# Patient Record
Sex: MALE | Race: WHITE | NOT HISPANIC OR LATINO | Employment: OTHER | URBAN - METROPOLITAN AREA
[De-identification: names, ages, dates, MRNs, and addresses within clinical notes are randomized per-mention and may not be internally consistent; named-entity substitution may affect disease eponyms.]

---

## 2017-05-19 ENCOUNTER — TRANSCRIBE ORDERS (OUTPATIENT)
Dept: ADMINISTRATIVE | Facility: HOSPITAL | Age: 67
End: 2017-05-19

## 2017-05-19 DIAGNOSIS — Z87.891 HISTORY OF SMOKING: Primary | ICD-10-CM

## 2017-05-22 ENCOUNTER — HOSPITAL ENCOUNTER (OUTPATIENT)
Dept: CT IMAGING | Facility: HOSPITAL | Age: 67
Discharge: HOME | End: 2017-05-22
Attending: INTERNAL MEDICINE
Payer: COMMERCIAL

## 2017-05-22 DIAGNOSIS — Z87.891 HISTORY OF SMOKING: ICD-10-CM

## 2017-09-13 ENCOUNTER — TRANSCRIBE ORDERS (OUTPATIENT)
Dept: ADMINISTRATIVE | Facility: HOSPITAL | Age: 67
End: 2017-09-13

## 2017-09-13 DIAGNOSIS — J44.9 OBSTRUCTIVE CHRONIC BRONCHITIS WITHOUT EXACERBATION (HCC): Primary | ICD-10-CM

## 2017-09-27 ENCOUNTER — HOSPITAL ENCOUNTER (OUTPATIENT)
Dept: PULMONOLOGY | Facility: HOSPITAL | Age: 67
Discharge: HOME | End: 2017-09-27
Attending: INTERNAL MEDICINE
Payer: MEDICARE

## 2017-09-27 ENCOUNTER — GENERIC CONVERSION - ENCOUNTER (OUTPATIENT)
Dept: OTHER | Facility: OTHER | Age: 67
End: 2017-09-27

## 2017-09-27 DIAGNOSIS — J44.9 OBSTRUCTIVE CHRONIC BRONCHITIS WITHOUT EXACERBATION (HCC): ICD-10-CM

## 2017-09-27 PROCEDURE — 94760 N-INVAS EAR/PLS OXIMETRY 1: CPT

## 2017-09-27 PROCEDURE — 94010 BREATHING CAPACITY TEST: CPT

## 2017-09-27 RX ORDER — ALBUTEROL SULFATE 2.5 MG/3ML
2.5 SOLUTION RESPIRATORY (INHALATION) ONCE AS NEEDED
Status: DISCONTINUED | OUTPATIENT
Start: 2017-09-27 | End: 2017-09-27

## 2018-01-17 ENCOUNTER — ALLSCRIPTS OFFICE VISIT (OUTPATIENT)
Dept: OTHER | Facility: OTHER | Age: 68
End: 2018-01-17

## 2018-01-17 DIAGNOSIS — R31.0 GROSS HEMATURIA: ICD-10-CM

## 2018-01-17 LAB
BILIRUB UR QL STRIP: NORMAL
CLARITY UR: NORMAL
COLOR UR: YELLOW
GLUCOSE (HISTORICAL): NORMAL
HGB UR QL STRIP.AUTO: NORMAL
KETONES UR STRIP-MCNC: NORMAL MG/DL
LEUKOCYTE ESTERASE UR QL STRIP: NORMAL
NITRITE UR QL STRIP: NORMAL
PH UR STRIP.AUTO: 7 [PH]
PROT UR STRIP-MCNC: NORMAL MG/DL
SP GR UR STRIP.AUTO: 1.01
UROBILINOGEN UR QL STRIP.AUTO: 0.2

## 2018-01-22 VITALS
HEIGHT: 70 IN | SYSTOLIC BLOOD PRESSURE: 110 MMHG | WEIGHT: 205 LBS | DIASTOLIC BLOOD PRESSURE: 62 MMHG | BODY MASS INDEX: 29.35 KG/M2

## 2018-01-31 ENCOUNTER — HOSPITAL ENCOUNTER (OUTPATIENT)
Dept: ULTRASOUND IMAGING | Facility: MEDICAL CENTER | Age: 68
Discharge: HOME | End: 2018-01-31
Attending: UROLOGY
Payer: MEDICARE

## 2018-01-31 DIAGNOSIS — R31.0 GROSS HEMATURIA: ICD-10-CM

## 2018-01-31 PROCEDURE — 76770 US EXAM ABDO BACK WALL COMP: CPT

## 2018-02-13 RX ORDER — FINASTERIDE 5 MG/1
TABLET, FILM COATED ORAL
COMMUNITY
End: 2018-03-04 | Stop reason: SDUPTHER

## 2018-02-13 RX ORDER — CALCIPOTRIENE 0.005 %
CREAM (GRAM) TOPICAL
COMMUNITY
End: 2020-04-07

## 2018-02-13 RX ORDER — RAMIPRIL 10 MG/1
10 CAPSULE ORAL DAILY
COMMUNITY

## 2018-02-13 RX ORDER — TERAZOSIN 5 MG/1
5 CAPSULE ORAL
COMMUNITY
End: 2022-08-08 | Stop reason: ALTCHOICE

## 2018-02-13 RX ORDER — LATANOPROST 50 UG/ML
1 SOLUTION/ DROPS OPHTHALMIC
COMMUNITY

## 2018-02-13 RX ORDER — CLOBETASOL PROPIONATE 0.5 MG/G
CREAM TOPICAL 2 TIMES DAILY PRN
COMMUNITY

## 2018-02-13 RX ORDER — PRAVASTATIN SODIUM 10 MG
10 TABLET ORAL DAILY
COMMUNITY

## 2018-02-13 RX ORDER — CHOLECALCIFEROL (VITAMIN D3) 1250 MCG
CAPSULE ORAL WEEKLY
COMMUNITY
End: 2020-06-26

## 2018-02-14 ENCOUNTER — PROCEDURE VISIT (OUTPATIENT)
Dept: UROLOGY | Facility: MEDICAL CENTER | Age: 68
End: 2018-02-14
Payer: MEDICARE

## 2018-02-14 VITALS
HEIGHT: 70 IN | SYSTOLIC BLOOD PRESSURE: 138 MMHG | WEIGHT: 205 LBS | BODY MASS INDEX: 29.35 KG/M2 | DIASTOLIC BLOOD PRESSURE: 72 MMHG

## 2018-02-14 DIAGNOSIS — N13.8 BENIGN PROSTATIC HYPERPLASIA WITH URINARY OBSTRUCTION: ICD-10-CM

## 2018-02-14 DIAGNOSIS — R31.0 GROSS HEMATURIA: Primary | ICD-10-CM

## 2018-02-14 DIAGNOSIS — N40.1 BENIGN PROSTATIC HYPERPLASIA WITH URINARY OBSTRUCTION: ICD-10-CM

## 2018-02-14 PROBLEM — H40.9 GLAUCOMA: Status: ACTIVE | Noted: 2018-01-11

## 2018-02-14 PROBLEM — I10 HYPERTENSION: Status: ACTIVE | Noted: 2018-01-11

## 2018-02-14 PROBLEM — L40.9 PSORIASIS: Status: ACTIVE | Noted: 2018-01-11

## 2018-02-14 PROBLEM — E78.00 HYPERCHOLESTEROLEMIA: Status: ACTIVE | Noted: 2018-01-11

## 2018-02-14 LAB
SL AMB  POCT GLUCOSE, UA: ABNORMAL
SL AMB LEUKOCYTE ESTERASE,UA: ABNORMAL
SL AMB POCT BILIRUBIN,UA: ABNORMAL
SL AMB POCT BLOOD,UA: ABNORMAL
SL AMB POCT CLARITY,UA: CLEAR
SL AMB POCT COLOR,UA: ABNORMAL
SL AMB POCT KETONES,UA: ABNORMAL
SL AMB POCT NITRITE,UA: ABNORMAL
SL AMB POCT PH,UA: 6.5
SL AMB POCT SPECIFIC GRAVITY,UA: 1.02
SL AMB POCT URINE PROTEIN: ABNORMAL
SL AMB POCT UROBILINOGEN: 1

## 2018-02-14 PROCEDURE — 81003 URINALYSIS AUTO W/O SCOPE: CPT | Performed by: UROLOGY

## 2018-02-14 PROCEDURE — 99213 OFFICE O/P EST LOW 20 MIN: CPT | Performed by: UROLOGY

## 2018-02-14 PROCEDURE — 52000 CYSTOURETHROSCOPY: CPT | Performed by: UROLOGY

## 2018-02-14 NOTE — PROGRESS NOTES
Assessment/Plan:    Gross hematuria  Renal ultrasound is unremarkable  On cystoscopy the prostate is very vascular and friable  No bladder tumor is evident  The prostatic friable it is the likely cause  Benign prostatic hyperplasia with urinary obstruction  AUA symptom score is 14 on combined therapy  The patient is pleased with his voiding pattern  Continue present therapy  PSA 0 5 8 December, 2017       Diagnoses and all orders for this visit:    Gross hematuria  -     POCT urine dip auto non-scope  -     Cystoscopy    Benign prostatic hyperplasia with urinary obstruction  -     PSA Total, Diagnostic; Future    Other orders  -     aspirin 81 MG tablet; Take by mouth  -     clobetasol (TEMOVATE) 0 05 % cream; Apply topically  -     Coenzyme Q10 (COQ-10) 200 MG CAPS; Take by mouth  -     calcipotriene (DOVONEX) 0 005 % cream; Apply topically  -     pravastatin (PRAVACHOL) 10 mg tablet; Take by mouth  -     finasteride (PROSCAR) 5 mg tablet; Take by mouth  -     ramipril (ALTACE) 10 MG capsule; Take by mouth  -     terazosin (HYTRIN) 5 mg capsule; Take by mouth  -     Cholecalciferol (VITAMIN D3) 38345 units CAPS; Take by mouth  -     latanoprost (XALATAN) 0 005 % ophthalmic solution; Apply to eye          Subjective:      Patient ID: Cristiane Luis is a 75 y o  male  58-year-old male on combined therapy for his lower tract symptoms who returns today for follow-up and to complete his evaluation for gross hematuria  He reports he is voiding well and has not had any change in his voiding pattern  No recent gross hematuria, dysuria or symptoms of infection  No flank pain  He is pleased with his voiding pattern          The following portions of the patient's history were reviewed and updated as appropriate: allergies, current medications, past family history, past medical history, past social history, past surgical history and problem list     Review of Systems   Constitutional: Negative for chills, diaphoresis, fatigue and fever  HENT: Negative  Eyes: Negative  Respiratory: Negative  Cardiovascular: Negative  Endocrine: Negative  Musculoskeletal: Negative  Skin: Negative  Allergic/Immunologic: Negative  Neurological: Negative  Hematological: Negative  Psychiatric/Behavioral: Negative  Objective:    Vitals:    02/14/18 1037   BP: 138/72        Physical Exam   Constitutional: He is oriented to person, place, and time  He appears well-developed and well-nourished  HENT:   Head: Normocephalic and atraumatic  Eyes: Pupils are equal, round, and reactive to light  Neck: Neck supple  Cardiovascular: Normal rate  Pulmonary/Chest: Effort normal    Abdominal: Soft  He exhibits no distension and no mass  There is no tenderness  There is no rebound and no guarding  Genitourinary: Penis normal    Neurological: He is alert and oriented to person, place, and time  Skin: Skin is warm and dry  Psychiatric: He has a normal mood and affect  His behavior is normal  Judgment and thought content normal        Cystoscopy  Date/Time: 2/14/2018 11:23 AM  Performed by: Bri Montoya  Authorized by: Bri Montoya     Procedure details: cystoscopy    Patient tolerance: Patient tolerated the procedure well with no immediate complications    Additional Procedure Details: Cystoscopy Procedure Note        Pre-operative Diagnosis: Hematuria    Post-operative Diagnosis:  Prostatic enlargement and friability, no evidence of bladder tumor      Procedure Details   The risks, benefits, complications, treatment options, and expected outcomes were discussed with the patient  The patient concurred with the proposed plan, giving informed consent  Cystoscopy was performed today under local anesthesia, using sterile technique  The patient was placed in the supine position, prepped and draped in the usual sterile fashion   A 15 American flexible cystoscope  was used to inspect both the urethra and bladder  Findings:  Normal urethra, trilobar prostatic hypertrophy  The prostate is very vascular with superficial vessels that bleed easily  Ureteral orifices are unremarkable  Bladder is moderately trabeculated  No stones, tumors or diverticula

## 2018-02-14 NOTE — ASSESSMENT & PLAN NOTE
AUA symptom score is 14 on combined therapy  The patient is pleased with his voiding pattern  Continue present therapy    PSA 0 5 8 December, 2017

## 2018-02-14 NOTE — ASSESSMENT & PLAN NOTE
Renal ultrasound is unremarkable  On cystoscopy the prostate is very vascular and friable  No bladder tumor is evident  The prostatic friable it is the likely cause

## 2018-02-14 NOTE — LETTER
February 14, 2018     Merline Roller, [place]  [address] [place] [address]    Patient: Bartolome Dinh   YOB: 1950   Date of Visit: 2/14/2018       Dear Dr Martin Marquez:    Thank you for referring Bartolome Dinh to me for evaluation  Below are my notes for this consultation  If you have questions, please do not hesitate to call me  I look forward to following your patient along with you  Sincerely,        Tavares Patten MD        CC: No Recipients  Tavares Patten MD  2/14/2018 11:26 AM  Sign at close encounter  Assessment/Plan:    Gross hematuria  Renal ultrasound is unremarkable  On cystoscopy the prostate is very vascular and friable  No bladder tumor is evident  The prostatic friable it is the likely cause  Benign prostatic hyperplasia with urinary obstruction  AUA symptom score is 14 on combined therapy  The patient is pleased with his voiding pattern  Continue present therapy  Diagnoses and all orders for this visit:    Gross hematuria  -     POCT urine dip auto non-scope    Benign prostatic hyperplasia with urinary obstruction    Other orders  -     aspirin 81 MG tablet; Take by mouth  -     clobetasol (TEMOVATE) 0 05 % cream; Apply topically  -     Coenzyme Q10 (COQ-10) 200 MG CAPS; Take by mouth  -     calcipotriene (DOVONEX) 0 005 % cream; Apply topically  -     pravastatin (PRAVACHOL) 10 mg tablet; Take by mouth  -     finasteride (PROSCAR) 5 mg tablet; Take by mouth  -     ramipril (ALTACE) 10 MG capsule; Take by mouth  -     terazosin (HYTRIN) 5 mg capsule; Take by mouth  -     Cholecalciferol (VITAMIN D3) 25359 units CAPS; Take by mouth  -     latanoprost (XALATAN) 0 005 % ophthalmic solution; Apply to eye          Subjective:      Patient ID: Bartolome Dinh is a 75 y o  male  59-year-old male on combined therapy for his lower tract symptoms who returns today for follow-up and to complete his evaluation for gross hematuria    He reports he is voiding well and has not had any change in his voiding pattern  No recent gross hematuria, dysuria or symptoms of infection  No flank pain  He is pleased with his voiding pattern  The following portions of the patient's history were reviewed and updated as appropriate: allergies, current medications, past family history, past medical history, past social history, past surgical history and problem list     Review of Systems   Constitutional: Negative for chills, diaphoresis, fatigue and fever  HENT: Negative  Eyes: Negative  Respiratory: Negative  Cardiovascular: Negative  Endocrine: Negative  Musculoskeletal: Negative  Skin: Negative  Allergic/Immunologic: Negative  Neurological: Negative  Hematological: Negative  Psychiatric/Behavioral: Negative  Objective:    Vitals:    02/14/18 1037   BP: 138/72        Physical Exam   Constitutional: He is oriented to person, place, and time  He appears well-developed and well-nourished  HENT:   Head: Normocephalic and atraumatic  Eyes: Pupils are equal, round, and reactive to light  Neck: Neck supple  Cardiovascular: Normal rate  Pulmonary/Chest: Effort normal    Abdominal: Soft  He exhibits no distension and no mass  There is no tenderness  There is no rebound and no guarding  Genitourinary: Penis normal    Neurological: He is alert and oriented to person, place, and time  Skin: Skin is warm and dry  Psychiatric: He has a normal mood and affect   His behavior is normal  Judgment and thought content normal        Cystoscopy  Date/Time: 2/14/2018 11:23 AM  Performed by: Bri Montoya  Authorized by: Bri Montoya     Procedure details: cystoscopy    Patient tolerance: Patient tolerated the procedure well with no immediate complications    Additional Procedure Details: Cystoscopy Procedure Note        Pre-operative Diagnosis: Hematuria    Post-operative Diagnosis:  Prostatic enlargement and friability, no evidence of bladder tumor      Procedure Details   The risks, benefits, complications, treatment options, and expected outcomes were discussed with the patient  The patient concurred with the proposed plan, giving informed consent  Cystoscopy was performed today under local anesthesia, using sterile technique  The patient was placed in the supine position, prepped and draped in the usual sterile fashion  A 15 Turkish flexible cystoscope  was used to inspect both the urethra and bladder  Findings:  Normal urethra, trilobar prostatic hypertrophy  The prostate is very vascular with superficial vessels that bleed easily  Ureteral orifices are unremarkable  Bladder is moderately trabeculated  No stones, tumors or diverticula

## 2018-02-14 NOTE — PATIENT INSTRUCTIONS

## 2018-03-02 DIAGNOSIS — N40.1 BPH WITH OBSTRUCTION/LOWER URINARY TRACT SYMPTOMS: Primary | ICD-10-CM

## 2018-03-02 DIAGNOSIS — N13.8 BPH WITH OBSTRUCTION/LOWER URINARY TRACT SYMPTOMS: Primary | ICD-10-CM

## 2018-03-04 RX ORDER — FINASTERIDE 5 MG/1
TABLET, FILM COATED ORAL
Qty: 90 TABLET | Refills: 3 | Status: SHIPPED | OUTPATIENT
Start: 2018-03-04 | End: 2019-02-25 | Stop reason: SDUPTHER

## 2018-04-26 ENCOUNTER — TELEPHONE (OUTPATIENT)
Dept: UROLOGY | Facility: MEDICAL CENTER | Age: 68
End: 2018-04-26

## 2018-04-26 NOTE — TELEPHONE ENCOUNTER
Spoke to pt and wife  Pt is asymptomatic besides frequency that isn't new  Received UC positive for >100,000 col/ml Klebsiella (Enterobacteria) Aerogenes  Will forward to Dr Bella Casas to advise informed pt that will get answer tomorrow and call

## 2018-04-26 NOTE — TELEPHONE ENCOUNTER
Patient's PCP found bacteria in his urine sample  Patient was told to call here to see if he needs to be put on an antibiotic  Please call him at [phone]

## 2018-04-27 DIAGNOSIS — N30.00 ACUTE CYSTITIS WITHOUT HEMATURIA: Primary | ICD-10-CM

## 2018-04-27 RX ORDER — SULFAMETHOXAZOLE AND TRIMETHOPRIM 800; 160 MG/1; MG/1
1 TABLET ORAL EVERY 12 HOURS SCHEDULED
Qty: 14 TABLET | Refills: 0 | Status: SHIPPED | OUTPATIENT
Start: 2018-04-27 | End: 2018-05-04

## 2018-05-17 PROCEDURE — 88305 TISSUE EXAM BY PATHOLOGIST: CPT | Performed by: PATHOLOGY

## 2018-05-18 ENCOUNTER — LAB REQUISITION (OUTPATIENT)
Dept: LAB | Facility: HOSPITAL | Age: 68
End: 2018-05-18
Payer: MEDICARE

## 2018-05-18 DIAGNOSIS — R13.19 OTHER DYSPHAGIA: ICD-10-CM

## 2019-02-22 ENCOUNTER — OFFICE VISIT (OUTPATIENT)
Dept: UROLOGY | Facility: MEDICAL CENTER | Age: 69
End: 2019-02-22
Payer: MEDICARE

## 2019-02-22 VITALS
SYSTOLIC BLOOD PRESSURE: 128 MMHG | HEART RATE: 70 BPM | HEIGHT: 70 IN | WEIGHT: 203 LBS | BODY MASS INDEX: 29.06 KG/M2 | DIASTOLIC BLOOD PRESSURE: 64 MMHG

## 2019-02-22 DIAGNOSIS — N40.1 BPH WITH OBSTRUCTION/LOWER URINARY TRACT SYMPTOMS: ICD-10-CM

## 2019-02-22 DIAGNOSIS — N13.8 BPH WITH OBSTRUCTION/LOWER URINARY TRACT SYMPTOMS: ICD-10-CM

## 2019-02-22 DIAGNOSIS — Z12.5 PROSTATE CANCER SCREENING: Primary | ICD-10-CM

## 2019-02-22 LAB
SL AMB  POCT GLUCOSE, UA: NORMAL
SL AMB LEUKOCYTE ESTERASE,UA: NORMAL
SL AMB POCT BILIRUBIN,UA: NORMAL
SL AMB POCT BLOOD,UA: NORMAL
SL AMB POCT CLARITY,UA: CLEAR
SL AMB POCT COLOR,UA: YELLOW
SL AMB POCT KETONES,UA: NORMAL
SL AMB POCT NITRITE,UA: NORMAL
SL AMB POCT PH,UA: 7
SL AMB POCT SPECIFIC GRAVITY,UA: 1.01
SL AMB POCT URINE PROTEIN: NORMAL
SL AMB POCT UROBILINOGEN: 0.2

## 2019-02-22 PROCEDURE — 81003 URINALYSIS AUTO W/O SCOPE: CPT | Performed by: NURSE PRACTITIONER

## 2019-02-22 PROCEDURE — 99213 OFFICE O/P EST LOW 20 MIN: CPT | Performed by: NURSE PRACTITIONER

## 2019-02-22 RX ORDER — PANTOPRAZOLE SODIUM 40 MG/1
TABLET, DELAYED RELEASE ORAL
COMMUNITY
Start: 2019-01-05 | End: 2020-01-22 | Stop reason: ALTCHOICE

## 2019-02-22 NOTE — PROGRESS NOTES
2/22/2019      Chief Complaint   Patient presents with    Benign Prostatic Hypertrophy     Assessment and Plan    70 y o  male managed by Dr Sridhar Altamirano  1  Benign prostatic hyperplasia  · Patient is status post cystoscopy on 02/14/2018 which is prostate was found to be very vascular and friable which at the time was the probable cause of his gross hematuria  · Patient denies any further episodes of gross hematuria  · PSA performed on 02/14/2019  results as 0 46  · Digital rectal exam demonstrates approximately 45 g prostate  Smooth, nontender, nonnodular and symmetrical  · Repeat PSA and ANALIA in 1 year    2  Erectile dysfunction  · Patient reports the ability to get an erection but difficulty maintaining  Acknowledges that this is the problem but currently does not want to intervene at this time  History of Present Illness  Roberto Farr is a 70 y o  male here for follow up evaluation of benign prostatic hyperplasia and routine prostate cancer screening  Patient presents  to the office free a complaints of gross hematuria  His last PSA performed 02/14/2019 results as 0 46  Patient with no complaints of urinary frequency and urgency  Patient denies dysuria, hematuria  Patient reports nocturia  He reports getting up approximately 1-2 times at night to urinate and is not bothered by this  Patient continues to take finasteride 5 mg p o  Daily and does not suffer side effects  Patient reports urinary stream to be good  Patient complains of erectile dysfunction  He reports that he is able to get an erection but has difficulty maintaining the erection  Initially he was seeking intervention for this but has changed his mind during our conversation  Patient with no complaints at this time  Review of Systems   Constitutional: Negative for chills and fever  Respiratory: Negative for cough and shortness of breath  Cardiovascular: Negative for chest pain     Gastrointestinal: Negative for abdominal distention, abdominal pain, blood in stool, nausea and vomiting  Genitourinary: Negative for difficulty urinating, dysuria, enuresis, flank pain, frequency, hematuria and urgency  Skin: Negative for rash       Past Medical History  Past Medical History:   Diagnosis Date    BPH with obstruction/lower urinary tract symptoms     Gross hematuria     Nocturia        Past Social History  Past Surgical History:   Procedure Laterality Date    APPENDECTOMY      ROTATOR CUFF REPAIR       Social History     Tobacco Use   Smoking Status Former Smoker   Smokeless Tobacco Never Used       Past Family History  Family History   Problem Relation Age of Onset    Lung disease Father         black lung    Heart attack Mother        Past Social history  Social History     Socioeconomic History    Marital status: /Civil Union     Spouse name: Not on file    Number of children: Not on file    Years of education: Not on file    Highest education level: Not on file   Occupational History    Not on file   Social Needs    Financial resource strain: Not on file    Food insecurity:     Worry: Not on file     Inability: Not on file    Transportation needs:     Medical: Not on file     Non-medical: Not on file   Tobacco Use    Smoking status: Former Smoker    Smokeless tobacco: Never Used   Substance and Sexual Activity    Alcohol use: Yes     Comment: Caffeine daily    Drug use: No    Sexual activity: Not on file   Lifestyle    Physical activity:     Days per week: Not on file     Minutes per session: Not on file    Stress: Not on file   Relationships    Social connections:     Talks on phone: Not on file     Gets together: Not on file     Attends Baptism service: Not on file     Active member of club or organization: Not on file     Attends meetings of clubs or organizations: Not on file     Relationship status: Not on file    Intimate partner violence:     Fear of current or ex partner: Not on file Emotionally abused: Not on file     Physically abused: Not on file     Forced sexual activity: Not on file   Other Topics Concern    Not on file   Social History Narrative    Not on file       Current Medications  Current Outpatient Medications   Medication Sig Dispense Refill    aspirin 81 MG tablet Take by mouth      calcipotriene (DOVONEX) 0 005 % cream Apply topically      Cholecalciferol (VITAMIN D3) 84279 units CAPS Take by mouth      clobetasol (TEMOVATE) 0 05 % cream Apply topically      Coenzyme Q10 (COQ-10) 200 MG CAPS Take by mouth      finasteride (PROSCAR) 5 mg tablet TAKE 1 TABLET DAILY 90 tablet 3    latanoprost (XALATAN) 0 005 % ophthalmic solution Apply to eye      pantoprazole (PROTONIX) 40 mg tablet       pravastatin (PRAVACHOL) 10 mg tablet Take by mouth      ramipril (ALTACE) 10 MG capsule Take by mouth      terazosin (HYTRIN) 5 mg capsule Take by mouth       No current facility-administered medications for this visit  Allergies  No Known Allergies      The following portions of the patient's history were reviewed and updated as appropriate: allergies, current medications, past medical history, past social history, past surgical history and problem list       Vitals  Vitals:    02/22/19 1312   BP: 128/64   BP Location: Left arm   Patient Position: Sitting   Cuff Size: Adult   Pulse: 70   Weight: 92 1 kg (203 lb)   Height: 5' 10" (1 778 m)     Physical Exam  Physical Exam   Constitutional: He is oriented to person, place, and time  He appears well-developed and well-nourished  No distress  HENT:   Head: Normocephalic and atraumatic  Eyes: EOM are normal    Neck: Normal range of motion  Neck supple  Cardiovascular: Normal rate, regular rhythm, normal heart sounds and intact distal pulses  No murmur heard  Pulmonary/Chest: Effort normal and breath sounds normal  No respiratory distress  Abdominal: Soft   Bowel sounds are normal    Genitourinary: Rectal exam shows external hemorrhoid  Rectal exam shows no tenderness  Prostate is enlarged  Prostate is not tender  Genitourinary Comments: Prostate noted to be approximately 45 g smooth, nontender  Nonnodular  Symmetrical    Musculoskeletal: Normal range of motion  Neurological: He is alert and oriented to person, place, and time  Skin: Skin is warm and dry  Psychiatric: He has a normal mood and affect  Vitals reviewed  Results  No results found for this or any previous visit (from the past 1 hour(s))  ]  No results found for: PSA  No results found for: GLUCOSE, CALCIUM, NA, K, CO2, CL, BUN, CREATININE  No results found for: WBC, HGB, HCT, MCV, PLT    Orders  Orders Placed This Encounter   Procedures    PSA, Total Screen     This is a patient instruction: This test is non-fasting  Please drink two glasses of water morning of bloodwork          Standing Status:   Future     Standing Expiration Date:   8/22/2020    POCT urine dip auto non-scope     JORGE L Felix

## 2019-02-25 DIAGNOSIS — N13.8 BPH WITH OBSTRUCTION/LOWER URINARY TRACT SYMPTOMS: ICD-10-CM

## 2019-02-25 DIAGNOSIS — N40.1 BPH WITH OBSTRUCTION/LOWER URINARY TRACT SYMPTOMS: ICD-10-CM

## 2019-02-25 RX ORDER — FINASTERIDE 5 MG/1
TABLET, FILM COATED ORAL
Qty: 90 TABLET | Refills: 3 | Status: SHIPPED | OUTPATIENT
Start: 2019-02-25 | End: 2020-02-25 | Stop reason: SDUPTHER

## 2019-07-15 ENCOUNTER — APPOINTMENT (OUTPATIENT)
Dept: LAB | Facility: CLINIC | Age: 69
End: 2019-07-15
Payer: MEDICARE

## 2019-07-15 ENCOUNTER — TRANSCRIBE ORDERS (OUTPATIENT)
Dept: ADMINISTRATIVE | Facility: HOSPITAL | Age: 69
End: 2019-07-15

## 2019-07-15 DIAGNOSIS — I10 ESSENTIAL HYPERTENSION, MALIGNANT: ICD-10-CM

## 2019-07-15 DIAGNOSIS — N40.1 BENIGN PROSTATIC HYPERPLASIA WITH URINARY OBSTRUCTION: ICD-10-CM

## 2019-07-15 DIAGNOSIS — I10 ESSENTIAL HYPERTENSION, MALIGNANT: Primary | ICD-10-CM

## 2019-07-15 DIAGNOSIS — N13.8 BENIGN PROSTATIC HYPERPLASIA WITH URINARY OBSTRUCTION: ICD-10-CM

## 2019-07-15 LAB
ALBUMIN SERPL BCP-MCNC: 3.9 G/DL (ref 3.5–5)
ALP SERPL-CCNC: 62 U/L (ref 46–116)
ALT SERPL W P-5'-P-CCNC: 41 U/L (ref 12–78)
ANION GAP SERPL CALCULATED.3IONS-SCNC: 4 MMOL/L (ref 4–13)
AST SERPL W P-5'-P-CCNC: 14 U/L (ref 5–45)
BACTERIA UR QL AUTO: ABNORMAL /HPF
BASOPHILS # BLD AUTO: 0.06 THOUSANDS/ΜL (ref 0–0.1)
BASOPHILS NFR BLD AUTO: 1 % (ref 0–1)
BILIRUB SERPL-MCNC: 0.47 MG/DL (ref 0.2–1)
BILIRUB UR QL STRIP: NEGATIVE
BUN SERPL-MCNC: 13 MG/DL (ref 5–25)
CALCIUM SERPL-MCNC: 9 MG/DL (ref 8.3–10.1)
CHLORIDE SERPL-SCNC: 107 MMOL/L (ref 100–108)
CHOLEST SERPL-MCNC: 148 MG/DL (ref 50–200)
CLARITY UR: ABNORMAL
CO2 SERPL-SCNC: 26 MMOL/L (ref 21–32)
COLOR UR: YELLOW
CREAT SERPL-MCNC: 1 MG/DL (ref 0.6–1.3)
EOSINOPHIL # BLD AUTO: 0.38 THOUSAND/ΜL (ref 0–0.61)
EOSINOPHIL NFR BLD AUTO: 7 % (ref 0–6)
ERYTHROCYTE [DISTWIDTH] IN BLOOD BY AUTOMATED COUNT: 13.2 % (ref 11.6–15.1)
GFR SERPL CREATININE-BSD FRML MDRD: 76 ML/MIN/1.73SQ M
GLUCOSE P FAST SERPL-MCNC: 102 MG/DL (ref 65–99)
GLUCOSE UR STRIP-MCNC: NEGATIVE MG/DL
HCT VFR BLD AUTO: 44.4 % (ref 36.5–49.3)
HDLC SERPL-MCNC: 41 MG/DL (ref 40–60)
HGB BLD-MCNC: 14.4 G/DL (ref 12–17)
HGB UR QL STRIP.AUTO: NEGATIVE
HYALINE CASTS #/AREA URNS LPF: ABNORMAL /LPF
IMM GRANULOCYTES # BLD AUTO: 0.02 THOUSAND/UL (ref 0–0.2)
IMM GRANULOCYTES NFR BLD AUTO: 0 % (ref 0–2)
KETONES UR STRIP-MCNC: NEGATIVE MG/DL
LDLC SERPL CALC-MCNC: 93 MG/DL (ref 0–100)
LEUKOCYTE ESTERASE UR QL STRIP: ABNORMAL
LYMPHOCYTES # BLD AUTO: 0.88 THOUSANDS/ΜL (ref 0.6–4.47)
LYMPHOCYTES NFR BLD AUTO: 16 % (ref 14–44)
MCH RBC QN AUTO: 31.2 PG (ref 26.8–34.3)
MCHC RBC AUTO-ENTMCNC: 32.4 G/DL (ref 31.4–37.4)
MCV RBC AUTO: 96 FL (ref 82–98)
MONOCYTES # BLD AUTO: 0.53 THOUSAND/ΜL (ref 0.17–1.22)
MONOCYTES NFR BLD AUTO: 10 % (ref 4–12)
NEUTROPHILS # BLD AUTO: 3.53 THOUSANDS/ΜL (ref 1.85–7.62)
NEUTS SEG NFR BLD AUTO: 66 % (ref 43–75)
NITRITE UR QL STRIP: POSITIVE
NON-SQ EPI CELLS URNS QL MICRO: ABNORMAL /HPF
NONHDLC SERPL-MCNC: 107 MG/DL
NRBC BLD AUTO-RTO: 0 /100 WBCS
PH UR STRIP.AUTO: 6.5 [PH]
PLATELET # BLD AUTO: 215 THOUSANDS/UL (ref 149–390)
PMV BLD AUTO: 11.1 FL (ref 8.9–12.7)
POTASSIUM SERPL-SCNC: 3.9 MMOL/L (ref 3.5–5.3)
PROT SERPL-MCNC: 7.4 G/DL (ref 6.4–8.2)
PROT UR STRIP-MCNC: NEGATIVE MG/DL
RBC # BLD AUTO: 4.61 MILLION/UL (ref 3.88–5.62)
RBC #/AREA URNS AUTO: ABNORMAL /HPF
SODIUM SERPL-SCNC: 137 MMOL/L (ref 136–145)
SP GR UR STRIP.AUTO: 1.02 (ref 1–1.03)
TRIGL SERPL-MCNC: 72 MG/DL
UROBILINOGEN UR QL STRIP.AUTO: 0.2 E.U./DL
WBC # BLD AUTO: 5.4 THOUSAND/UL (ref 4.31–10.16)
WBC #/AREA URNS AUTO: ABNORMAL /HPF

## 2019-07-15 PROCEDURE — 85025 COMPLETE CBC W/AUTO DIFF WBC: CPT

## 2019-07-15 PROCEDURE — 36415 COLL VENOUS BLD VENIPUNCTURE: CPT

## 2019-07-15 PROCEDURE — 81001 URINALYSIS AUTO W/SCOPE: CPT | Performed by: INTERNAL MEDICINE

## 2019-07-15 PROCEDURE — 80053 COMPREHEN METABOLIC PANEL: CPT

## 2019-07-15 PROCEDURE — 80061 LIPID PANEL: CPT

## 2019-08-08 ENCOUNTER — TRANSCRIBE ORDERS (OUTPATIENT)
Dept: ADMINISTRATIVE | Facility: HOSPITAL | Age: 69
End: 2019-08-08

## 2019-08-08 ENCOUNTER — APPOINTMENT (OUTPATIENT)
Dept: LAB | Facility: CLINIC | Age: 69
End: 2019-08-08
Payer: MEDICARE

## 2019-08-08 DIAGNOSIS — N40.0 BENIGN PROSTATIC HYPERPLASIA WITHOUT LOWER URINARY TRACT SYMPTOMS: Primary | ICD-10-CM

## 2019-08-08 LAB
BACTERIA UR QL AUTO: ABNORMAL /HPF
BILIRUB UR QL STRIP: NEGATIVE
CLARITY UR: CLEAR
COLOR UR: YELLOW
GLUCOSE UR STRIP-MCNC: NEGATIVE MG/DL
HGB UR QL STRIP.AUTO: NEGATIVE
HYALINE CASTS #/AREA URNS LPF: ABNORMAL /LPF
KETONES UR STRIP-MCNC: NEGATIVE MG/DL
LEUKOCYTE ESTERASE UR QL STRIP: ABNORMAL
NITRITE UR QL STRIP: NEGATIVE
NON-SQ EPI CELLS URNS QL MICRO: ABNORMAL /HPF
PH UR STRIP.AUTO: 7.5 [PH]
PROT UR STRIP-MCNC: NEGATIVE MG/DL
RBC #/AREA URNS AUTO: ABNORMAL /HPF
SP GR UR STRIP.AUTO: 1.01 (ref 1–1.03)
UROBILINOGEN UR QL STRIP.AUTO: 0.2 E.U./DL
WBC #/AREA URNS AUTO: ABNORMAL /HPF

## 2019-08-08 PROCEDURE — 81001 URINALYSIS AUTO W/SCOPE: CPT | Performed by: INTERNAL MEDICINE

## 2019-08-12 ENCOUNTER — TRANSCRIBE ORDERS (OUTPATIENT)
Dept: ADMINISTRATIVE | Facility: HOSPITAL | Age: 69
End: 2019-08-12

## 2019-08-12 DIAGNOSIS — Z82.3 FAMILY HISTORY OF STROKE: Primary | ICD-10-CM

## 2019-08-21 ENCOUNTER — HOSPITAL ENCOUNTER (OUTPATIENT)
Dept: NON INVASIVE DIAGNOSTICS | Facility: CLINIC | Age: 69
Discharge: HOME | End: 2019-08-21
Payer: COMMERCIAL

## 2019-08-21 DIAGNOSIS — Z82.3 FAMILY HISTORY OF STROKE: ICD-10-CM

## 2019-08-21 PROCEDURE — VASC: Performed by: SURGERY

## 2019-08-21 PROCEDURE — 93922 UPR/L XTREMITY ART 2 LEVELS: CPT

## 2019-11-27 ENCOUNTER — TELEPHONE (OUTPATIENT)
Dept: UROLOGY | Facility: MEDICAL CENTER | Age: 69
End: 2019-11-27

## 2019-12-03 ENCOUNTER — PROCEDURE VISIT (OUTPATIENT)
Dept: UROLOGY | Facility: MEDICAL CENTER | Age: 69
End: 2019-12-03
Payer: MEDICARE

## 2019-12-03 VITALS — HEART RATE: 86 BPM | BODY MASS INDEX: 29.06 KG/M2 | WEIGHT: 203 LBS | TEMPERATURE: 97 F | HEIGHT: 70 IN

## 2019-12-03 DIAGNOSIS — R33.9 RETENTION OF URINE: Primary | ICD-10-CM

## 2019-12-03 PROCEDURE — 99211 OFF/OP EST MAY X REQ PHY/QHP: CPT

## 2019-12-03 NOTE — PROGRESS NOTES
12/3/2019    Sara Welch  1950  [id number]    Diagnosis  Chief Complaint     Urinary Retention          Patient presents for catheter removal managed by Dr Salma Nevarez to assess reason for urinary retention    Procedure Nunes removal/voiding trial    Nunes catheter removed after deflation of an intact balloon  Patient tolerated well  Encouraged patient to hydrate well  He knows to call office if uncomfortable and unable to urinate  Patient understands all instructions  Pt states he has been taking Finasteride for a long time  He is not taking Tamsulosin  He had a lot of intestinal gas prior to going into retention  He saw strings of clot in urine  Urine is clear stas now  He agrees to schedule one soon  He is aware he has an enlarged prostate and has not had a cystoscopy in a long time  He agrees to schedule one now        Vitals:    12/03/19 0925   Pulse: 86   Temp: (!) 97 °F (36 1 °C)   TempSrc: Tympanic   Weight: 92 1 kg (203 lb)   Height: 5' 10" (1 778 m)           Charlene Khan

## 2019-12-27 RX ORDER — CLOTRIMAZOLE 10 MG/1
LOZENGE ORAL; TOPICAL
Refills: 0 | COMMUNITY
Start: 2019-10-30 | End: 2020-01-22 | Stop reason: ALTCHOICE

## 2019-12-27 RX ORDER — CHLORHEXIDINE GLUCONATE 0.12 MG/ML
RINSE ORAL
Refills: 0 | COMMUNITY
Start: 2019-10-30 | End: 2020-01-22 | Stop reason: ALTCHOICE

## 2019-12-31 ENCOUNTER — PROCEDURE VISIT (OUTPATIENT)
Dept: UROLOGY | Facility: MEDICAL CENTER | Age: 69
End: 2019-12-31
Payer: MEDICARE

## 2019-12-31 VITALS
SYSTOLIC BLOOD PRESSURE: 120 MMHG | WEIGHT: 205 LBS | DIASTOLIC BLOOD PRESSURE: 80 MMHG | HEIGHT: 70 IN | BODY MASS INDEX: 29.35 KG/M2 | HEART RATE: 88 BPM

## 2019-12-31 DIAGNOSIS — R33.9 RETENTION OF URINE: ICD-10-CM

## 2019-12-31 DIAGNOSIS — N13.8 BPH WITH OBSTRUCTION/LOWER URINARY TRACT SYMPTOMS: Primary | ICD-10-CM

## 2019-12-31 DIAGNOSIS — N40.1 BPH WITH OBSTRUCTION/LOWER URINARY TRACT SYMPTOMS: Primary | ICD-10-CM

## 2019-12-31 LAB
SL AMB  POCT GLUCOSE, UA: ABNORMAL
SL AMB LEUKOCYTE ESTERASE,UA: ABNORMAL
SL AMB POCT BILIRUBIN,UA: ABNORMAL
SL AMB POCT BLOOD,UA: ABNORMAL
SL AMB POCT CLARITY,UA: CLEAR
SL AMB POCT COLOR,UA: YELLOW
SL AMB POCT KETONES,UA: ABNORMAL
SL AMB POCT NITRITE,UA: ABNORMAL
SL AMB POCT PH,UA: 7
SL AMB POCT SPECIFIC GRAVITY,UA: 1.02
SL AMB POCT URINE PROTEIN: ABNORMAL
SL AMB POCT UROBILINOGEN: 0.2

## 2019-12-31 PROCEDURE — 81003 URINALYSIS AUTO W/O SCOPE: CPT | Performed by: UROLOGY

## 2019-12-31 PROCEDURE — 99215 OFFICE O/P EST HI 40 MIN: CPT | Performed by: UROLOGY

## 2019-12-31 PROCEDURE — 52000 CYSTOURETHROSCOPY: CPT | Performed by: UROLOGY

## 2019-12-31 NOTE — PROGRESS NOTES
Assessment/Plan:      Diagnoses and all orders for this visit:    BPH with obstruction/lower urinary tract symptoms  -     POCT urine dip auto non-scope    Retention of urine  -     POCT urine dip auto non-scope    Other orders  -     clotrimazole (MYCELEX) 10 mg miller; DISSOLVE 1 TABLET IN MOUTH 5 TIMES A DAY  -     chlorhexidine (PERIDEX) 0 12 % solution; RINSE MOUTH WITH 1/2 OZ  FOR 30 SECONDS 2 TIMES A DAY        Plan:  The patient may benefit from further evaluation of undergoing a minimally invasive procedure to relieve his bladder outlet obstruction such as a Urolift  Will arrange for the proper referral      Subjective:  Restricted urine flow     Patient ID: Marylin Puga is a 78 y o  male  HPI  78 y o  male here for follow up evaluation of his recent history of urinary retention due to of benign prostatic hyperplasia  Patient reports nocturia  He reports getting up approximately 1-2 times at night to urinate and is not bothered by this  Patient continues to take finasteride 5 mg p o   and is now on tamsulosin daily  He is for cystoscopy today  Patient reports urinary stream to be adequate  Patient complains of erectile dysfunction  He reports that he is able to get an erection but has difficulty maintaining the erection  Initially he was seeking intervention for this but has changed his mind during our conversation  Patient with no complaints at this time  Review of Systems   Constitutional: Negative  HENT: Negative  Eyes: Negative  Respiratory: Negative  Cardiovascular: Negative  Gastrointestinal: Negative  Endocrine: Negative  Genitourinary: Negative  Musculoskeletal: Negative  Skin: Negative  Allergic/Immunologic: Negative  Neurological: Negative  Hematological: Negative  Psychiatric/Behavioral: Negative  Objective:     Physical Exam   Constitutional: He is oriented to person, place, and time   He appears well-developed and well-nourished  No distress  HENT:   Head: Normocephalic and atraumatic  Nose: Nose normal    Mouth/Throat: Oropharynx is clear and moist    Eyes: Pupils are equal, round, and reactive to light  Conjunctivae and EOM are normal  No scleral icterus  Neck: Normal range of motion  Neck supple  Cardiovascular: Normal rate, regular rhythm, normal heart sounds and intact distal pulses  No murmur heard  Pulmonary/Chest: Effort normal and breath sounds normal  No respiratory distress  He has no wheezes  He has no rales  Abdominal: Soft  Bowel sounds are normal  He exhibits no distension and no mass  There is no tenderness  Musculoskeletal: Normal range of motion  He exhibits no edema or tenderness  Lymphadenopathy:     He has no cervical adenopathy  Neurological: He is alert and oriented to person, place, and time  No cranial nerve deficit  Skin: Skin is warm and dry  No rash noted  No erythema  No pallor  Psychiatric: He has a normal mood and affect  His behavior is normal  Judgment and thought content normal    Nursing note and vitals reviewed        Refer to cystoscopy procedure note

## 2019-12-31 NOTE — PROGRESS NOTES
Cystoscopy  Date/Time: 12/31/2019 11:19 AM  Performed by: Lee Landa MD  Authorized by: Lee Landa MD     Procedure details: cystoscopy    Patient tolerance: Patient tolerated the procedure well with no immediate complications        The patient was placed supine on the procedure table and prepped and draped in the penoscrotal area in the usual manner  A lubricated 16 Macedonian flexible cystoscope was inserted per urethra where there was no evidence of urethral lesion, stricture, or obstruction seen  His prostatic urethra appeared significantly obstructing with trilobar hypertrophy  The bladder was entered and pan cystoscoped  There was 2/4 bladder wall trabeculation, with no tumors, stones, or diverticuli seen  His bilateral ureteral orifices were in their normal position orientation, with clear efflux of urine  The cystoscope was removed, and he tolerated procedure well

## 2020-01-22 ENCOUNTER — OFFICE VISIT (OUTPATIENT)
Dept: UROLOGY | Facility: MEDICAL CENTER | Age: 70
End: 2020-01-22
Payer: MEDICARE

## 2020-01-22 VITALS
BODY MASS INDEX: 28.92 KG/M2 | WEIGHT: 202 LBS | DIASTOLIC BLOOD PRESSURE: 78 MMHG | HEART RATE: 78 BPM | HEIGHT: 70 IN | SYSTOLIC BLOOD PRESSURE: 120 MMHG

## 2020-01-22 DIAGNOSIS — N40.1 BPH WITH OBSTRUCTION/LOWER URINARY TRACT SYMPTOMS: Primary | ICD-10-CM

## 2020-01-22 DIAGNOSIS — R33.9 RETENTION OF URINE: ICD-10-CM

## 2020-01-22 DIAGNOSIS — N13.8 BPH WITH OBSTRUCTION/LOWER URINARY TRACT SYMPTOMS: Primary | ICD-10-CM

## 2020-01-22 LAB
SL AMB  POCT GLUCOSE, UA: ABNORMAL
SL AMB LEUKOCYTE ESTERASE,UA: ABNORMAL
SL AMB POCT BILIRUBIN,UA: ABNORMAL
SL AMB POCT BLOOD,UA: ABNORMAL
SL AMB POCT CLARITY,UA: CLEAR
SL AMB POCT COLOR,UA: YELLOW
SL AMB POCT KETONES,UA: ABNORMAL
SL AMB POCT NITRITE,UA: ABNORMAL
SL AMB POCT PH,UA: 7
SL AMB POCT SPECIFIC GRAVITY,UA: 1.01
SL AMB POCT URINE PROTEIN: ABNORMAL
SL AMB POCT UROBILINOGEN: 0.2

## 2020-01-22 PROCEDURE — 99214 OFFICE O/P EST MOD 30 MIN: CPT | Performed by: UROLOGY

## 2020-01-22 PROCEDURE — 81003 URINALYSIS AUTO W/O SCOPE: CPT | Performed by: UROLOGY

## 2020-01-22 NOTE — LETTER
January 22, 2020     [facility],  [place] [address]  [address]  [address] [place] [address]    Patient: Edvin House   YOB: 1950   Date of Visit: 1/22/2020       Dear Dr Kyle Gibbs:    Thank you for referring Edvin House to me for evaluation  Below are my notes for this consultation  If you have questions, please do not hesitate to call me  I look forward to following your patient along with you  Sincerely,        Zaheer Copeland MD        CC: No Recipients  Zaheer Copeland MD  1/22/2020 10:12 AM  Sign at close encounter  Assessment/Plan:    Benign prostatic hyperplasia with urinary obstruction  The patient's symptoms have gotten worse and he is had an episode of urinary retention  AUA symptom score is 26  He is not happy with his voiding pattern  We discussed treatment options  He recently had cystoscopy by Dr Elan Stinson  I did recommend  he return for transrectal ultrasound of the prostate and uroflow study  Will then be in a better position to decide which procedure would be appropriate for him  In the interim he will gradually increase his terazosin to 10 mg        Diagnoses and all orders for this visit:    BPH with obstruction/lower urinary tract symptoms  -     POCT urine dip auto non-scope  -     Uroflow w/ post void residual; Future    Retention of urine          Subjective:      Patient ID: Edvin House is a 78 y o  male  Benign Prostatic Hypertrophy   This is a chronic problem  The current episode started more than 1 year ago  The problem has been gradually worsening since onset  Irritative symptoms include frequency, nocturia (Nocturia x 3) and urgency (No incontinence)  Obstructive symptoms include dribbling, an intermittent stream and a slower stream  Obstructive symptoms do not include incomplete emptying, straining or a weak stream  Pertinent negatives include no chills, dysuria, genital pain, hematuria, hesitancy, nausea or vomiting  AUA score is 20-35   His sexual activity is non-contributory to the current illness  Nothing aggravates the symptoms  Past treatments include terazosin and finasteride  The treatment provided mild relief  He has been using treatment for 2 or more years  The following portions of the patient's history were reviewed and updated as appropriate: allergies, current medications, past family history, past medical history, past social history, past surgical history and problem list     Review of Systems   Constitutional: Negative for chills, diaphoresis, fatigue and fever  HENT: Negative  Eyes: Negative  Respiratory: Negative  Cardiovascular: Negative  Gastrointestinal: Negative  Negative for nausea and vomiting  Endocrine: Negative  Genitourinary: Positive for frequency, nocturia (Nocturia x 3) and urgency (No incontinence)  Negative for dysuria, hematuria, hesitancy and incomplete emptying  See HPI   Musculoskeletal: Negative  Skin: Negative  Allergic/Immunologic: Negative  Neurological: Negative  Hematological: Negative  Psychiatric/Behavioral: Negative  AUA SYMPTOM SCORE      Most Recent Value   AUA SYMPTOM SCORE   How often have you had a sensation of not emptying your bladder completely after you finished urinating? 4   How often have you had to urinate again less than two hours after you finished urinating? 4   How often have you found you stopped and started again several times when you urinate? 4   How often have you found it difficult to postpone urination? 4   How often have you had a weak urinary stream?  4   How often have you had to push or strain to begin urination? 3   How many times did you most typically get up to urinate from the time you went to bed at night until the time you got up in the morning?   3   Quality of Life: If you were to spend the rest of your life with your urinary condition just the way it is now, how would you feel about that?  4   AUA SYMPTOM SCORE 26        Objective:      /78   Pulse 78   Ht 5' 10" (1 778 m)   Wt 91 6 kg (202 lb)   BMI 28 98 kg/m²           Physical Exam   Constitutional: He is oriented to person, place, and time  He appears well-developed and well-nourished  HENT:   Head: Normocephalic and atraumatic  Eyes: Conjunctivae are normal    Neck: Neck supple  Cardiovascular: Normal rate  Pulmonary/Chest: Effort normal    Abdominal: Soft  He exhibits no distension and no mass  There is no tenderness  There is no rebound and no guarding  No hernia  Bladder decompressed   Genitourinary: Penis normal    Genitourinary Comments: Testes descended and normal to palpation   Musculoskeletal: Normal range of motion  Neurological: He is alert and oriented to person, place, and time  Skin: Skin is warm and dry  Psychiatric: He has a normal mood and affect  His behavior is normal  Judgment and thought content normal    Vitals reviewed

## 2020-01-22 NOTE — PROGRESS NOTES
Assessment/Plan:    Benign prostatic hyperplasia with urinary obstruction  The patient's symptoms have gotten worse and he is had an episode of urinary retention  AUA symptom score is 26  He is not happy with his voiding pattern  We discussed treatment options  He recently had cystoscopy by Dr Roberto Carlos Esqueda  I did recommend  he return for transrectal ultrasound of the prostate and uroflow study  Will then be in a better position to decide which procedure would be appropriate for him  In the interim he will gradually increase his terazosin to 10 mg        Diagnoses and all orders for this visit:    BPH with obstruction/lower urinary tract symptoms  -     POCT urine dip auto non-scope  -     Uroflow w/ post void residual; Future    Retention of urine          Subjective:      Patient ID: Derek Dupont is a 78 y o  male  Benign Prostatic Hypertrophy   This is a chronic problem  The current episode started more than 1 year ago  The problem has been gradually worsening since onset  Irritative symptoms include frequency, nocturia (Nocturia x 3) and urgency (No incontinence)  Obstructive symptoms include dribbling, an intermittent stream and a slower stream  Obstructive symptoms do not include incomplete emptying, straining or a weak stream  Pertinent negatives include no chills, dysuria, genital pain, hematuria, hesitancy, nausea or vomiting  AUA score is 20-35  His sexual activity is non-contributory to the current illness  Nothing aggravates the symptoms  Past treatments include terazosin and finasteride  The treatment provided mild relief  He has been using treatment for 2 or more years  The following portions of the patient's history were reviewed and updated as appropriate: allergies, current medications, past family history, past medical history, past social history, past surgical history and problem list     Review of Systems   Constitutional: Negative for chills, diaphoresis, fatigue and fever  HENT: Negative  Eyes: Negative  Respiratory: Negative  Cardiovascular: Negative  Gastrointestinal: Negative  Negative for nausea and vomiting  Endocrine: Negative  Genitourinary: Positive for frequency, nocturia (Nocturia x 3) and urgency (No incontinence)  Negative for dysuria, hematuria, hesitancy and incomplete emptying  See HPI   Musculoskeletal: Negative  Skin: Negative  Allergic/Immunologic: Negative  Neurological: Negative  Hematological: Negative  Psychiatric/Behavioral: Negative  AUA SYMPTOM SCORE      Most Recent Value   AUA SYMPTOM SCORE   How often have you had a sensation of not emptying your bladder completely after you finished urinating? 4   How often have you had to urinate again less than two hours after you finished urinating? 4   How often have you found you stopped and started again several times when you urinate? 4   How often have you found it difficult to postpone urination? 4   How often have you had a weak urinary stream?  4   How often have you had to push or strain to begin urination? 3   How many times did you most typically get up to urinate from the time you went to bed at night until the time you got up in the morning? 3   Quality of Life: If you were to spend the rest of your life with your urinary condition just the way it is now, how would you feel about that?  4   AUA SYMPTOM SCORE  26        Objective:      /78   Pulse 78   Ht 5' 10" (1 778 m)   Wt 91 6 kg (202 lb)   BMI 28 98 kg/m²          Physical Exam   Constitutional: He is oriented to person, place, and time  He appears well-developed and well-nourished  HENT:   Head: Normocephalic and atraumatic  Eyes: Conjunctivae are normal    Neck: Neck supple  Cardiovascular: Normal rate  Pulmonary/Chest: Effort normal    Abdominal: Soft  He exhibits no distension and no mass  There is no tenderness  There is no rebound and no guarding  No hernia     Bladder decompressed   Genitourinary: Penis normal    Genitourinary Comments: Testes descended and normal to palpation   Musculoskeletal: Normal range of motion  Neurological: He is alert and oriented to person, place, and time  Skin: Skin is warm and dry  Psychiatric: He has a normal mood and affect  His behavior is normal  Judgment and thought content normal    Vitals reviewed

## 2020-02-24 DIAGNOSIS — N13.8 BPH WITH OBSTRUCTION/LOWER URINARY TRACT SYMPTOMS: ICD-10-CM

## 2020-02-24 DIAGNOSIS — N40.1 BPH WITH OBSTRUCTION/LOWER URINARY TRACT SYMPTOMS: ICD-10-CM

## 2020-02-24 NOTE — TELEPHONE ENCOUNTER
The patient was last seen on 1/22/2020 by Dr Vinay Barrera in the [facility] location  For the past two years, he has been taking Finasteride 5mg and Terazosin but has been experiencing gradually worsening bothersome urinary symptoms  The only drug recommendation was gradually increasing the Terazosin to 10mg QD  There is no mention of Finasteride 5mg  Message was referred to the Advanced Practitioner covering the [facility] location for further direction and/or advise

## 2020-02-24 NOTE — TELEPHONE ENCOUNTER
No he should continue with the terazosin and finasteride as ordered  These work on different receptors within the prostate itself to ensure optimal effectiveness both medication should be continued

## 2020-02-25 RX ORDER — FINASTERIDE 5 MG/1
5 TABLET, FILM COATED ORAL DAILY
Qty: 90 TABLET | Refills: 3 | Status: SHIPPED | OUTPATIENT
Start: 2020-02-25 | End: 2020-08-07 | Stop reason: SDUPTHER

## 2020-02-25 NOTE — TELEPHONE ENCOUNTER
Script for Finasteride 5mg 1 PO QD #90 with 3 refills was queued and forwarded to the Advanced Practitioner covering the [facility] location for approval

## 2020-03-02 ENCOUNTER — PROCEDURE VISIT (OUTPATIENT)
Dept: UROLOGY | Facility: MEDICAL CENTER | Age: 70
End: 2020-03-02
Payer: MEDICARE

## 2020-03-02 VITALS
WEIGHT: 202 LBS | HEART RATE: 73 BPM | SYSTOLIC BLOOD PRESSURE: 126 MMHG | DIASTOLIC BLOOD PRESSURE: 78 MMHG | BODY MASS INDEX: 28.92 KG/M2 | HEIGHT: 70 IN

## 2020-03-02 DIAGNOSIS — N40.1 BPH WITH OBSTRUCTION/LOWER URINARY TRACT SYMPTOMS: Primary | ICD-10-CM

## 2020-03-02 DIAGNOSIS — N13.8 BPH WITH OBSTRUCTION/LOWER URINARY TRACT SYMPTOMS: Primary | ICD-10-CM

## 2020-03-02 DIAGNOSIS — N13.8 BENIGN PROSTATIC HYPERPLASIA WITH URINARY OBSTRUCTION: ICD-10-CM

## 2020-03-02 DIAGNOSIS — N40.1 BENIGN PROSTATIC HYPERPLASIA WITH URINARY OBSTRUCTION: ICD-10-CM

## 2020-03-02 LAB — POST-VOID RESIDUAL VOLUME, ML POC: 20 ML

## 2020-03-02 PROCEDURE — 76872 US TRANSRECTAL: CPT | Performed by: UROLOGY

## 2020-03-02 PROCEDURE — 51798 US URINE CAPACITY MEASURE: CPT | Performed by: UROLOGY

## 2020-03-02 PROCEDURE — 51741 ELECTRO-UROFLOWMETRY FIRST: CPT | Performed by: UROLOGY

## 2020-03-02 PROCEDURE — 99214 OFFICE O/P EST MOD 30 MIN: CPT | Performed by: UROLOGY

## 2020-03-02 NOTE — PROGRESS NOTES
Assessment/Plan:    BPH with obstruction/lower urinary tract symptoms   AUA symptom score is 26  He is not happy with his voiding pattern  PSA is normal at 0 55  ( February 24, 2020)  He became tired and noted decreased blood pressure when he tried to increase the terazosin  We discussed treatment options including urolift, GreenLight laser and transurethral resection of the prostate  The pros and cons of each were discussed  Risks were discussed  At the conclusion were discussion he wished to proceed with urolift placement  The procedure was then described in detail  Risks were discussed and consent form signed  Diagnoses and all orders for this visit:    BPH with obstruction/lower urinary tract symptoms  -     POCT Measure PVR  -     Uroflow    Benign prostatic hyperplasia with urinary obstruction          Subjective:      Patient ID: Ludmila Valdovinos is a 78 y o  male  Benign Prostatic Hypertrophy   This is a chronic problem  The current episode started more than 1 year ago  The problem has been gradually worsening since onset  Irritative symptoms include frequency, nocturia (Nocturia x 3) and urgency (No incontinence)  Obstructive symptoms include dribbling, an intermittent stream and a slower stream  Obstructive symptoms do not include incomplete emptying, straining or a weak stream  Pertinent negatives include no chills, dysuria, genital pain, hematuria, hesitancy, nausea or vomiting  AUA score is 20-35  His sexual activity is non-contributory to the current illness  Nothing aggravates the symptoms  Past treatments include terazosin and finasteride  The treatment provided mild relief  He has been using treatment for 2 or more years         The following portions of the patient's history were reviewed and updated as appropriate: allergies, current medications, past family history, past medical history, past social history, past surgical history and problem list     Review of Systems Constitutional: Negative  Negative for chills, diaphoresis, fatigue and fever  HENT: Negative  Eyes: Negative  Respiratory: Positive for shortness of breath  Cardiovascular: Negative  Endocrine: Negative  Genitourinary:        See HPI   Musculoskeletal: Negative  Skin: Negative  Allergic/Immunologic: Negative  Neurological: Negative  Hematological: Negative  Psychiatric/Behavioral: Negative  Objective:      /78 (BP Location: Left arm, Patient Position: Sitting, Cuff Size: Adult)   Pulse 73   Ht 5' 10" (1 778 m)   Wt 91 6 kg (202 lb)   BMI 28 98 kg/m²          Physical Exam   Constitutional: He is oriented to person, place, and time  He appears well-developed and well-nourished  HENT:   Head: Normocephalic and atraumatic  Eyes: Conjunctivae are normal    Neck: Neck supple  Cardiovascular: Normal rate  Pulmonary/Chest: Effort normal    Abdominal: Soft  Bowel sounds are normal  He exhibits no distension and no mass  There is no tenderness  There is no rebound, no guarding and no CVA tenderness  No hernia  Genitourinary: Rectum normal, testes normal and penis normal  Right testis shows no mass  Left testis shows no mass  No phimosis or hypospadias  Genitourinary Comments:   Prostate is Moderately enlarged and palpably benign  Musculoskeletal: He exhibits no edema  Neurological: He is alert and oriented to person, place, and time  Skin: Skin is warm and dry  Psychiatric: He has a normal mood and affect  His behavior is normal  Judgment and thought content normal    Vitals reviewed  Transrectal ultrasound of the prostate     transrectal ultrasound of the prostate was performed at 8 megahertz  Seminal vesicles are unremarkable  The prostate is enlarged measuring 80 g in size  Prostate height is 5  Cm  Width 5 7 cm and length 5 3 cm  No  hypoechoic lesion is noted in the peripheral zone    The transition zone is enlarged and heterogeneous in echotexture  Dense calcifications are noted  The visualized bladder is unremarkable and no significant median lobe or intra prostatic extension is noted  Impression:  prostatic enlargement      Uroflow with postvoid residual     The patient voided 170 cc  Maximum flow was 11 4 mL per sec  Average flow was 8 7 mL/sec  Voiding time is prolonged and peak flow slightly low  Postvoid residual is 20 cc  Overall pattern is consistent with prostatic obstruction

## 2020-03-02 NOTE — ADDENDUM NOTE
Addended byTeressa Ball on: 3/2/2020 12:46 PM     Modules accepted: Orders, Level of Service, SmartSet

## 2020-03-02 NOTE — PROGRESS NOTES
Uroflow  Date/Time: 3/2/2020 12:01 PM  Performed by: Kat Sneed MA  Authorized by: Kat Sneed MA       Comments:      Uroflow performed prior to [address]  The results are reviewed by Dr Mary Ruiz  Results are noted in procedure note

## 2020-03-02 NOTE — PATIENT INSTRUCTIONS
Benign Prostatic Hypertrophy   WHAT YOU NEED TO KNOW:   Benign prostatic hypertrophy (BPH) is a condition that causes your prostate gland to grow larger than normal  The prostate gland is the male sex gland that produces a fluid that is part of semen  It is about the size of a walnut and it is located under the bladder  As the prostate grows, it can squeeze the urethra  This can block urine flow and cause urinary problems  DISCHARGE INSTRUCTIONS:   Medicines:   · Alpha blockers: This medicine relaxes the muscles in your prostate and bladder  It may help you urinate more easily  · 5 alpha reductase inhibitors: These medicines block the production of a hormone that causes the prostate to get larger  It may help slow the growth of the prostate or shrink the prostate  · Take your medicine as directed  Contact your healthcare provider if you think your medicine is not helping or if you have side effects  Tell him or her if you are allergic to any medicine  Keep a list of the medicines, vitamins, and herbs you take  Include the amounts, and when and why you take them  Bring the list or the pill bottles to follow-up visits  Carry your medicine list with you in case of an emergency  Follow up with your healthcare provider as directed:  Write down your questions so you remember to ask them during your visits  Manage BPH:   · Do not let your bladder get too full before you empty it  Urinate when you feel the urge  · Limit alcohol  Do not drink large amounts of any liquid at one time  · Decrease the amount of salt you eat  Examples of salty foods are chips, cured meats, and canned soups  Do not use table salt  · Healthcare providers may tell you not to eat spicy foods such as chilli peppers  This may help you find out if spicy food makes your BPH symptoms worse  · You may have sex if you feel well  Contact your healthcare provider if:   · There is a large amount of blood in your urine  · Your signs and symptoms get worse  · You have a fever  · You have questions or concerns about your condition or care  Seek care immediately if:   · You are unable to urinate  · Your bladder feels very full and painful  © 2017 [employer] [employer] Information is for End User's use only and may not be sold, redistributed or otherwise used for commercial purposes  All illustrations and images included in CareNotes® are the copyrighted property of A D A M [employer] Inc  or [employer]  The above information is an  only  It is not intended as medical advice for individual conditions or treatments  Talk to your doctor, nurse or pharmacist before following any medical regimen to see if it is safe and effective for you

## 2020-03-02 NOTE — LETTER
March 2, 2020     Beverly Elia, [address]  [address] [place] [address]    Patient: Martha Rollins   YOB: 1950   Date of Visit: 3/2/2020       Dear Dr Susan Soni:    Thank you for referring Martha Rollins to me for evaluation  Below are my notes for this consultation  If you have questions, please do not hesitate to call me  I look forward to following your patient along with you  Sincerely,        Mine العراقي MD        CC: No Recipients  Mine العراقي MD  3/2/2020 12:42 PM  Addendum  Assessment/Plan:    BPH with obstruction/lower urinary tract symptoms   AUA symptom score is 26  He is not happy with his voiding pattern  PSA is normal at 0 55  ( February 24, 2020)  He became tired and noted decreased blood pressure when he tried to increase the terazosin  We discussed treatment options including urolift, GreenLight laser and transurethral resection of the prostate  The pros and cons of each were discussed  Risks were discussed  At the conclusion were discussion he wished to proceed with urolift placement  The procedure was then described in detail  Risks were discussed and consent form signed  Diagnoses and all orders for this visit:    BPH with obstruction/lower urinary tract symptoms  -     POCT Measure PVR  -     Uroflow    Benign prostatic hyperplasia with urinary obstruction          Subjective:      Patient ID: Martha Rollins is a 78 y o  male  Benign Prostatic Hypertrophy   This is a chronic problem  The current episode started more than 1 year ago  The problem has been gradually worsening since onset  Irritative symptoms include frequency, nocturia (Nocturia x 3) and urgency (No incontinence)   Obstructive symptoms include dribbling, an intermittent stream and a slower stream  Obstructive symptoms do not include incomplete emptying, straining or a weak stream  Pertinent negatives include no chills, dysuria, genital pain, hematuria, hesitancy, nausea or vomiting  AUA score is 20-35  His sexual activity is non-contributory to the current illness  Nothing aggravates the symptoms  Past treatments include terazosin and finasteride  The treatment provided mild relief  He has been using treatment for 2 or more years  The following portions of the patient's history were reviewed and updated as appropriate: allergies, current medications, past family history, past medical history, past social history, past surgical history and problem list     Review of Systems   Constitutional: Negative  Negative for chills, diaphoresis, fatigue and fever  HENT: Negative  Eyes: Negative  Respiratory: Positive for shortness of breath  Cardiovascular: Negative  Endocrine: Negative  Genitourinary:        See HPI   Musculoskeletal: Negative  Skin: Negative  Allergic/Immunologic: Negative  Neurological: Negative  Hematological: Negative  Psychiatric/Behavioral: Negative  Objective:      /78 (BP Location: Left arm, Patient Position: Sitting, Cuff Size: Adult)   Pulse 73   Ht 5' 10" (1 778 m)   Wt 91 6 kg (202 lb)   BMI 28 98 kg/m²           Physical Exam   Constitutional: He is oriented to person, place, and time  He appears well-developed and well-nourished  HENT:   Head: Normocephalic and atraumatic  Eyes: Conjunctivae are normal    Neck: Neck supple  Cardiovascular: Normal rate  Pulmonary/Chest: Effort normal    Abdominal: Soft  Bowel sounds are normal  He exhibits no distension and no mass  There is no tenderness  There is no rebound, no guarding and no CVA tenderness  No hernia  Genitourinary: Rectum normal, testes normal and penis normal  Right testis shows no mass  Left testis shows no mass  No phimosis or hypospadias  Genitourinary Comments:   Prostate is Moderately enlarged and palpably benign  Musculoskeletal: He exhibits no edema  Neurological: He is alert and oriented to person, place, and time  Skin: Skin is warm and dry  Psychiatric: He has a normal mood and affect  His behavior is normal  Judgment and thought content normal    Vitals reviewed  Transrectal ultrasound of the prostate     transrectal ultrasound of the prostate was performed at 8 megahertz  Seminal vesicles are unremarkable  The prostate is enlarged measuring 80 g in size  Prostate height is 5  Cm  Width 5 7 cm and length 5 3 cm  No  hypoechoic lesion is noted in the peripheral zone  The transition zone is enlarged and heterogeneous in echotexture  Dense calcifications are noted  The visualized bladder is unremarkable and no significant median lobe or intra prostatic extension is noted  Impression:  prostatic enlargement      Uroflow with postvoid residual     The patient voided 170 cc  Maximum flow was 11 4 mL per sec  Average flow was 8 7 mL/sec  Voiding time is prolonged and peak flow slightly low  Postvoid residual is 20 cc  Overall pattern is consistent with prostatic obstruction

## 2020-03-03 ENCOUNTER — TELEPHONE (OUTPATIENT)
Dept: UROLOGY | Facility: MEDICAL CENTER | Age: 70
End: 2020-03-03

## 2020-03-03 PROBLEM — N13.8 BENIGN PROSTATIC HYPERPLASIA WITH URINARY OBSTRUCTION: Status: ACTIVE | Noted: 2020-03-03

## 2020-03-03 PROBLEM — N40.1 BENIGN PROSTATIC HYPERPLASIA WITH URINARY OBSTRUCTION: Status: ACTIVE | Noted: 2020-03-03

## 2020-03-03 NOTE — TELEPHONE ENCOUNTER
I spoke to the patient and scheduled his Urolift with Dr Jessica Meadows for 4/30/2020 at [facility]  Patient declined sooner at [facility]      -instructions given verbally and mailed  -patient will need CBC, CMP, Urine C&S and EKG 7-10 days prior  -patient will stop his Aspirin 7 days prior  -no clearances ordered  -MCr/ - No Auth required

## 2020-04-08 NOTE — TELEPHONE ENCOUNTER
I Spoke to the patient and explained that we need to cancel his 4/302020 Urolift  I advised that we will call back once the situation returns to normal to discuss rescheduling

## 2020-05-08 PROCEDURE — 1123F ACP DISCUSS/DSCN MKR DOCD: CPT | Performed by: UROLOGY

## 2020-05-08 NOTE — TELEPHONE ENCOUNTER
I spoke to the patient and rescheduled his Urolift with Dr Perez Gladewater 7/2/2020 at [facility]

## 2020-06-25 ENCOUNTER — APPOINTMENT (OUTPATIENT)
Dept: LAB | Facility: MEDICAL CENTER | Age: 70
End: 2020-06-25
Attending: UROLOGY
Payer: MEDICARE

## 2020-06-25 ENCOUNTER — CLINICAL SUPPORT (OUTPATIENT)
Dept: URGENT CARE | Facility: MEDICAL CENTER | Age: 70
End: 2020-06-25
Attending: UROLOGY
Payer: MEDICARE

## 2020-06-25 DIAGNOSIS — N40.1 ENLARGED PROSTATE WITH URINARY OBSTRUCTION: ICD-10-CM

## 2020-06-25 DIAGNOSIS — Z11.59 SCREENING FOR VIRAL DISEASE: ICD-10-CM

## 2020-06-25 DIAGNOSIS — N13.8 ENLARGED PROSTATE WITH URINARY OBSTRUCTION: ICD-10-CM

## 2020-06-25 DIAGNOSIS — Z01.812 PRE-OPERATIVE LABORATORY EXAMINATION: ICD-10-CM

## 2020-06-25 DIAGNOSIS — Z01.810 PRE-OPERATIVE CARDIOVASCULAR EXAMINATION: ICD-10-CM

## 2020-06-25 DIAGNOSIS — R39.89 SUSPECTED UTI: ICD-10-CM

## 2020-06-25 LAB
ALBUMIN SERPL BCP-MCNC: 3.8 G/DL (ref 3.5–5)
ALP SERPL-CCNC: 65 U/L (ref 46–116)
ALT SERPL W P-5'-P-CCNC: 40 U/L (ref 12–78)
ANION GAP SERPL CALCULATED.3IONS-SCNC: 7 MMOL/L (ref 4–13)
AST SERPL W P-5'-P-CCNC: 18 U/L (ref 5–45)
ATRIAL RATE: 65 BPM
BACTERIA UR QL AUTO: ABNORMAL /HPF
BASOPHILS # BLD AUTO: 0.07 THOUSANDS/ΜL (ref 0–0.1)
BASOPHILS NFR BLD AUTO: 1 % (ref 0–1)
BILIRUB SERPL-MCNC: 0.63 MG/DL (ref 0.2–1)
BILIRUB UR QL STRIP: NEGATIVE
BUN SERPL-MCNC: 14 MG/DL (ref 5–25)
CALCIUM SERPL-MCNC: 8.9 MG/DL (ref 8.3–10.1)
CHLORIDE SERPL-SCNC: 107 MMOL/L (ref 100–108)
CLARITY UR: CLEAR
CO2 SERPL-SCNC: 24 MMOL/L (ref 21–32)
COLOR UR: YELLOW
CREAT SERPL-MCNC: 1.05 MG/DL (ref 0.6–1.3)
EOSINOPHIL # BLD AUTO: 0.25 THOUSAND/ΜL (ref 0–0.61)
EOSINOPHIL NFR BLD AUTO: 4 % (ref 0–6)
ERYTHROCYTE [DISTWIDTH] IN BLOOD BY AUTOMATED COUNT: 12.7 % (ref 11.6–15.1)
GFR SERPL CREATININE-BSD FRML MDRD: 71 ML/MIN/1.73SQ M
GLUCOSE P FAST SERPL-MCNC: 94 MG/DL (ref 65–99)
GLUCOSE UR STRIP-MCNC: NEGATIVE MG/DL
HCT VFR BLD AUTO: 45.2 % (ref 36.5–49.3)
HGB BLD-MCNC: 15.2 G/DL (ref 12–17)
HGB UR QL STRIP.AUTO: NEGATIVE
IMM GRANULOCYTES # BLD AUTO: 0.02 THOUSAND/UL (ref 0–0.2)
IMM GRANULOCYTES NFR BLD AUTO: 0 % (ref 0–2)
KETONES UR STRIP-MCNC: NEGATIVE MG/DL
LEUKOCYTE ESTERASE UR QL STRIP: ABNORMAL
LYMPHOCYTES # BLD AUTO: 1.11 THOUSANDS/ΜL (ref 0.6–4.47)
LYMPHOCYTES NFR BLD AUTO: 18 % (ref 14–44)
MCH RBC QN AUTO: 31.3 PG (ref 26.8–34.3)
MCHC RBC AUTO-ENTMCNC: 33.6 G/DL (ref 31.4–37.4)
MCV RBC AUTO: 93 FL (ref 82–98)
MONOCYTES # BLD AUTO: 0.71 THOUSAND/ΜL (ref 0.17–1.22)
MONOCYTES NFR BLD AUTO: 12 % (ref 4–12)
NEUTROPHILS # BLD AUTO: 3.94 THOUSANDS/ΜL (ref 1.85–7.62)
NEUTS SEG NFR BLD AUTO: 65 % (ref 43–75)
NITRITE UR QL STRIP: POSITIVE
NON-SQ EPI CELLS URNS QL MICRO: ABNORMAL /HPF
NRBC BLD AUTO-RTO: 0 /100 WBCS
P AXIS: 20 DEGREES
PH UR STRIP.AUTO: 6.5 [PH]
PLATELET # BLD AUTO: 199 THOUSANDS/UL (ref 149–390)
PMV BLD AUTO: 11.1 FL (ref 8.9–12.7)
POTASSIUM SERPL-SCNC: 4 MMOL/L (ref 3.5–5.3)
PR INTERVAL: 172 MS
PROT SERPL-MCNC: 8 G/DL (ref 6.4–8.2)
PROT UR STRIP-MCNC: NEGATIVE MG/DL
QRS AXIS: -6 DEGREES
QRSD INTERVAL: 90 MS
QT INTERVAL: 442 MS
QTC INTERVAL: 459 MS
RBC # BLD AUTO: 4.85 MILLION/UL (ref 3.88–5.62)
RBC #/AREA URNS AUTO: ABNORMAL /HPF
SODIUM SERPL-SCNC: 138 MMOL/L (ref 136–145)
SP GR UR STRIP.AUTO: 1.02 (ref 1–1.03)
T WAVE AXIS: 43 DEGREES
UROBILINOGEN UR QL STRIP.AUTO: 0.2 E.U./DL
VENTRICULAR RATE: 65 BPM
WBC # BLD AUTO: 6.1 THOUSAND/UL (ref 4.31–10.16)
WBC #/AREA URNS AUTO: ABNORMAL /HPF

## 2020-06-25 PROCEDURE — 87077 CULTURE AEROBIC IDENTIFY: CPT

## 2020-06-25 PROCEDURE — U0003 INFECTIOUS AGENT DETECTION BY NUCLEIC ACID (DNA OR RNA); SEVERE ACUTE RESPIRATORY SYNDROME CORONAVIRUS 2 (SARS-COV-2) (CORONAVIRUS DISEASE [COVID-19]), AMPLIFIED PROBE TECHNIQUE, MAKING USE OF HIGH THROUGHPUT TECHNOLOGIES AS DESCRIBED BY CMS-2020-01-R: HCPCS

## 2020-06-25 PROCEDURE — 85025 COMPLETE CBC W/AUTO DIFF WBC: CPT

## 2020-06-25 PROCEDURE — 81001 URINALYSIS AUTO W/SCOPE: CPT | Performed by: UROLOGY

## 2020-06-25 PROCEDURE — 93010 ELECTROCARDIOGRAM REPORT: CPT | Performed by: INTERNAL MEDICINE

## 2020-06-25 PROCEDURE — 87147 CULTURE TYPE IMMUNOLOGIC: CPT

## 2020-06-25 PROCEDURE — 93005 ELECTROCARDIOGRAM TRACING: CPT

## 2020-06-25 PROCEDURE — 87086 URINE CULTURE/COLONY COUNT: CPT

## 2020-06-25 PROCEDURE — 87186 SC STD MICRODIL/AGAR DIL: CPT

## 2020-06-25 PROCEDURE — 36415 COLL VENOUS BLD VENIPUNCTURE: CPT

## 2020-06-25 PROCEDURE — 80053 COMPREHEN METABOLIC PANEL: CPT

## 2020-06-26 DIAGNOSIS — N30.00 ACUTE CYSTITIS WITHOUT HEMATURIA: Primary | ICD-10-CM

## 2020-06-26 RX ORDER — MULTIVITAMIN
1 TABLET ORAL DAILY
COMMUNITY

## 2020-06-26 RX ORDER — CEPHALEXIN 500 MG/1
500 CAPSULE ORAL EVERY 12 HOURS SCHEDULED
Qty: 14 CAPSULE | Refills: 0 | Status: SHIPPED | OUTPATIENT
Start: 2020-06-26 | End: 2020-07-03

## 2020-06-26 NOTE — PRE-PROCEDURE INSTRUCTIONS
Pre-Surgery Instructions:   Medication Instructions    aspirin 81 MG tablet Patient was instructed by Physician and understands   clobetasol (TEMOVATE) 0 05 % cream Instructed patient per Anesthesia Guidelines   Coenzyme Q10 (COQ-10) 200 MG CAPS Instructed patient per Anesthesia Guidelines   finasteride (PROSCAR) 5 mg tablet Instructed patient per Anesthesia Guidelines   latanoprost (XALATAN) 0 005 % ophthalmic solution Instructed patient per Anesthesia Guidelines   Multiple Vitamin (MULTIVITAMIN) tablet Instructed patient per Anesthesia Guidelines   pravastatin (PRAVACHOL) 10 mg tablet Instructed patient per Anesthesia Guidelines   ramipril (ALTACE) 10 MG capsule Instructed patient per Anesthesia Guidelines   terazosin (HYTRIN) 5 mg capsule Instructed patient per Anesthesia Guidelines  Patient given/ instructed on use of chlorhexidine soap per hospital protocol    Patient instructed to stop all ASA, NSAIDS, vitamins and herbal supplements one week prior to surgery

## 2020-06-27 LAB
BACTERIA UR CULT: ABNORMAL
BACTERIA UR CULT: ABNORMAL
SARS-COV-2 RNA SPEC QL NAA+PROBE: NOT DETECTED

## 2020-07-01 ENCOUNTER — ANESTHESIA EVENT (OUTPATIENT)
Dept: PERIOP | Facility: HOSPITAL | Age: 70
End: 2020-07-01
Payer: MEDICARE

## 2020-07-01 DIAGNOSIS — N30.00 ACUTE CYSTITIS WITHOUT HEMATURIA: Primary | ICD-10-CM

## 2020-07-01 PROCEDURE — NC001 PR NO CHARGE: Performed by: UROLOGY

## 2020-07-01 RX ORDER — LEVOFLOXACIN 500 MG/1
500 TABLET, FILM COATED ORAL EVERY 24 HOURS
Qty: 7 TABLET | Refills: 0 | Status: SHIPPED | OUTPATIENT
Start: 2020-07-01 | End: 2020-07-08

## 2020-07-01 NOTE — H&P
HISTORY AND PHYSICAL  ? ? Patient Name: Tino Holguin  Patient MRN: [id number]  Attending Provider: Dolly Nsacimento MD  Service: Urology  Chief Complaint    Difficulty urinating    HPI   Tino Holguin is a 69 y o  male with lower urinary tract symptoms secondary to BPH  I plan cystoscopy with urolift insertion  Potential risks and complications discussed, and informed consent was given by the patient  Medications  Meds/Allergies     No current facility-administered medications for this encounter  Cannot display prior to admission medications because the patient has not been admitted in this contact  No current facility-administered medications for this encounter       Current Outpatient Medications:     aspirin 81 MG tablet, Take 81 mg by mouth daily , Disp: , Rfl:     clobetasol (TEMOVATE) 0 05 % cream, Apply topically 2 (two) times a day as needed , Disp: , Rfl:     Coenzyme Q10 (COQ-10) 200 MG CAPS, Take by mouth daily , Disp: , Rfl:     finasteride (PROSCAR) 5 mg tablet, Take 1 tablet (5 mg total) by mouth daily, Disp: 90 tablet, Rfl: 3    latanoprost (XALATAN) 0 005 % ophthalmic solution, Administer 1 drop to both eyes , Disp: , Rfl:     Multiple Vitamin (MULTIVITAMIN) tablet, Take 1 tablet by mouth daily, Disp: , Rfl:     pravastatin (PRAVACHOL) 10 mg tablet, Take 10 mg by mouth daily , Disp: , Rfl:     ramipril (ALTACE) 10 MG capsule, Take 10 mg by mouth daily , Disp: , Rfl:     terazosin (HYTRIN) 5 mg capsule, Take 5 mg by mouth daily at bedtime , Disp: , Rfl:     cephalexin (KEFLEX) 500 mg capsule, Take 1 capsule (500 mg total) by mouth every 12 (twelve) hours for 7 days, Disp: 14 capsule, Rfl: 0    levofloxacin (LEVAQUIN) 500 mg tablet, Take 1 tablet (500 mg total) by mouth every 24 hours for 7 days, Disp: 7 tablet, Rfl: 0  Review of Systems  10 point review of systems negative except as noted in HPI  Allergies  No Known Allergies  PMH  Past Medical History:   Diagnosis Date  BPH with obstruction/lower urinary tract symptoms     Exercise involving walking     every other day/had been going to gym before pandemic    GERD (gastroesophageal reflux disease)     no problems now    Glaucoma     bilat    Gross hematuria     in the past    History of kidney stones     Hyperlipidemia     Hypertension     Nocturia     Psoriasis     Teeth missing     Vitamin D deficiency, unspecified     Wears glasses      Past surgical history  Past Surgical History:   Procedure Laterality Date    APPENDECTOMY      COLONOSCOPY      HAND SURGERY      trauma to 2nd and 3rd fingers    ROTATOR CUFF REPAIR Right     TOOTH EXTRACTION       Social history  Social History     Tobacco Use    Smoking status: Former Smoker    Smokeless tobacco: Never Used   Substance Use Topics    Alcohol use: Yes     Frequency: Monthly or less     Comment: Caffeine daily    Drug use: No     ?  Physical Exam      Ht 5' 10 5" (1 791 m)   Wt 94 3 kg (208 lb)   BMI 29 42 kg/m²   General appearance: alert and oriented, in no acute distress  Head: Normocephalic, without obvious abnormality, atraumatic  Neck: no adenopathy, no carotid bruit, no JVD, supple, symmetrical, trachea midline and thyroid not enlarged, symmetric, no tenderness/mass/nodules  Back: symmetric, no curvature  ROM normal  No CVA tenderness    Lungs: clear to auscultation bilaterally  Heart: regular rate and rhythm  Abdomen: soft, non-tender; bowel sounds normal; no masses,  no organomegaly  Male genitalia: normal  Extremities: extremities normal, warm and well-perfused; no cyanosis, clubbing, or edema  Neurologic: Grossly normal     Rachel Venegas MD

## 2020-07-02 ENCOUNTER — HOSPITAL ENCOUNTER (OUTPATIENT)
Facility: HOSPITAL | Age: 70
Setting detail: OUTPATIENT SURGERY
Discharge: HOME | End: 2020-07-02
Attending: UROLOGY | Admitting: UROLOGY
Payer: MEDICARE

## 2020-07-02 ENCOUNTER — TELEPHONE (OUTPATIENT)
Dept: UROLOGY | Facility: MEDICAL CENTER | Age: 70
End: 2020-07-02

## 2020-07-02 ENCOUNTER — ANESTHESIA (OUTPATIENT)
Dept: PERIOP | Facility: HOSPITAL | Age: 70
End: 2020-07-02
Payer: MEDICARE

## 2020-07-02 VITALS
TEMPERATURE: 96.8 F | OXYGEN SATURATION: 95 % | RESPIRATION RATE: 18 BRPM | HEIGHT: 71 IN | SYSTOLIC BLOOD PRESSURE: 132 MMHG | DIASTOLIC BLOOD PRESSURE: 64 MMHG | WEIGHT: 203.71 LBS | HEART RATE: 90 BPM | BODY MASS INDEX: 28.52 KG/M2

## 2020-07-02 DIAGNOSIS — N13.8 BENIGN PROSTATIC HYPERPLASIA WITH URINARY OBSTRUCTION: Primary | ICD-10-CM

## 2020-07-02 DIAGNOSIS — N40.1 BENIGN PROSTATIC HYPERPLASIA WITH URINARY OBSTRUCTION: Primary | ICD-10-CM

## 2020-07-02 PROCEDURE — 52442 CYSTO INS TRNSPRSTC IMPLT EA: CPT | Performed by: UROLOGY

## 2020-07-02 PROCEDURE — L8699 PROSTHETIC IMPLANT NOS: HCPCS | Performed by: UROLOGY

## 2020-07-02 PROCEDURE — 52441 CYSTO INSJ TRNSPRSTC 1 IMPLT: CPT | Performed by: UROLOGY

## 2020-07-02 DEVICE — IMPLANT URO PROSTATE UROLIFT: Type: IMPLANTABLE DEVICE | Site: PROSTATE | Status: FUNCTIONAL

## 2020-07-02 RX ORDER — FENTANYL CITRATE 50 UG/ML
INJECTION, SOLUTION INTRAMUSCULAR; INTRAVENOUS AS NEEDED
Status: DISCONTINUED | OUTPATIENT
Start: 2020-07-02 | End: 2020-07-02 | Stop reason: SURG

## 2020-07-02 RX ORDER — SODIUM CHLORIDE 9 MG/ML
125 INJECTION, SOLUTION INTRAVENOUS CONTINUOUS
Status: DISCONTINUED | OUTPATIENT
Start: 2020-07-02 | End: 2020-07-02 | Stop reason: HOSPADM

## 2020-07-02 RX ORDER — ONDANSETRON 2 MG/ML
INJECTION INTRAMUSCULAR; INTRAVENOUS AS NEEDED
Status: DISCONTINUED | OUTPATIENT
Start: 2020-07-02 | End: 2020-07-02 | Stop reason: SURG

## 2020-07-02 RX ORDER — PROPOFOL 10 MG/ML
INJECTION, EMULSION INTRAVENOUS AS NEEDED
Status: DISCONTINUED | OUTPATIENT
Start: 2020-07-02 | End: 2020-07-02 | Stop reason: SURG

## 2020-07-02 RX ORDER — ONDANSETRON 2 MG/ML
4 INJECTION INTRAMUSCULAR; INTRAVENOUS EVERY 6 HOURS PRN
Status: DISCONTINUED | OUTPATIENT
Start: 2020-07-02 | End: 2020-07-02 | Stop reason: HOSPADM

## 2020-07-02 RX ORDER — PHENAZOPYRIDINE HYDROCHLORIDE 200 MG/1
200 TABLET, FILM COATED ORAL
Status: DISCONTINUED | OUTPATIENT
Start: 2020-07-02 | End: 2020-07-02 | Stop reason: HOSPADM

## 2020-07-02 RX ORDER — FUROSEMIDE 10 MG/ML
INJECTION INTRAMUSCULAR; INTRAVENOUS AS NEEDED
Status: DISCONTINUED | OUTPATIENT
Start: 2020-07-02 | End: 2020-07-02 | Stop reason: SURG

## 2020-07-02 RX ORDER — LEVOFLOXACIN 5 MG/ML
750 INJECTION, SOLUTION INTRAVENOUS ONCE
Status: COMPLETED | OUTPATIENT
Start: 2020-07-02 | End: 2020-07-02

## 2020-07-02 RX ORDER — ACETAMINOPHEN 325 MG/1
650 TABLET ORAL EVERY 6 HOURS PRN
Status: DISCONTINUED | OUTPATIENT
Start: 2020-07-02 | End: 2020-07-02 | Stop reason: HOSPADM

## 2020-07-02 RX ORDER — EPHEDRINE SULFATE 50 MG/ML
INJECTION INTRAVENOUS AS NEEDED
Status: DISCONTINUED | OUTPATIENT
Start: 2020-07-02 | End: 2020-07-02 | Stop reason: SURG

## 2020-07-02 RX ORDER — OXYCODONE HYDROCHLORIDE 5 MG/1
5 TABLET ORAL EVERY 4 HOURS PRN
Status: DISCONTINUED | OUTPATIENT
Start: 2020-07-02 | End: 2020-07-02 | Stop reason: HOSPADM

## 2020-07-02 RX ORDER — LIDOCAINE HYDROCHLORIDE 10 MG/ML
INJECTION, SOLUTION EPIDURAL; INFILTRATION; INTRACAUDAL; PERINEURAL AS NEEDED
Status: DISCONTINUED | OUTPATIENT
Start: 2020-07-02 | End: 2020-07-02 | Stop reason: SURG

## 2020-07-02 RX ORDER — PHENAZOPYRIDINE HYDROCHLORIDE 200 MG/1
200 TABLET, FILM COATED ORAL 3 TIMES DAILY PRN
Qty: 10 TABLET | Refills: 0 | Status: SHIPPED | OUTPATIENT
Start: 2020-07-02 | End: 2020-11-10 | Stop reason: ALTCHOICE

## 2020-07-02 RX ORDER — MAGNESIUM HYDROXIDE 1200 MG/15ML
LIQUID ORAL AS NEEDED
Status: DISCONTINUED | OUTPATIENT
Start: 2020-07-02 | End: 2020-07-02 | Stop reason: HOSPADM

## 2020-07-02 RX ORDER — FENTANYL CITRATE 50 UG/ML
25 INJECTION, SOLUTION INTRAMUSCULAR; INTRAVENOUS
Status: DISCONTINUED | OUTPATIENT
Start: 2020-07-02 | End: 2020-07-02 | Stop reason: HOSPADM

## 2020-07-02 RX ADMIN — FENTANYL CITRATE 50 MCG: 50 INJECTION, SOLUTION INTRAMUSCULAR; INTRAVENOUS at 08:41

## 2020-07-02 RX ADMIN — FUROSEMIDE 10 MG: 10 INJECTION, SOLUTION INTRAMUSCULAR; INTRAVENOUS at 08:50

## 2020-07-02 RX ADMIN — LEVOFLOXACIN 750 MG: 5 INJECTION, SOLUTION INTRAVENOUS at 07:42

## 2020-07-02 RX ADMIN — PHENAZOPYRIDINE 200 MG: 200 TABLET ORAL at 11:33

## 2020-07-02 RX ADMIN — FENTANYL CITRATE 50 MCG: 50 INJECTION, SOLUTION INTRAMUSCULAR; INTRAVENOUS at 08:30

## 2020-07-02 RX ADMIN — PROPOFOL 200 MG: 10 INJECTION, EMULSION INTRAVENOUS at 08:20

## 2020-07-02 RX ADMIN — ONDANSETRON 4 MG: 2 INJECTION INTRAMUSCULAR; INTRAVENOUS at 08:30

## 2020-07-02 RX ADMIN — SODIUM CHLORIDE 125 ML/HR: 0.9 INJECTION, SOLUTION INTRAVENOUS at 10:09

## 2020-07-02 RX ADMIN — LIDOCAINE HYDROCHLORIDE 60 MG: 10 INJECTION, SOLUTION EPIDURAL; INFILTRATION; INTRACAUDAL; PERINEURAL at 08:20

## 2020-07-02 RX ADMIN — SODIUM CHLORIDE 125 ML/HR: 0.9 INJECTION, SOLUTION INTRAVENOUS at 06:43

## 2020-07-02 RX ADMIN — EPHEDRINE SULFATE 10 MG: 50 INJECTION, SOLUTION INTRAVENOUS at 08:55

## 2020-07-02 NOTE — DISCHARGE SUMMARY
DISCHARGE SUMMARY     Patient Name: Rene Lujan    Patient MRN: [id number]    Admitting Provider: Chelo Hernandez MD    Discharging Provider: Chelo Hernandez MD    Primary Care Physician at Discharge: Omar Samayoa [phone]     Admission Date: 7/2/2020     Discharge Date: 7/2/2020    Admission Diagnosis   Enlarged prostate with urinary obstruction [N40 1, N13 8]    Discharge Diagnoses  Active Problems:    * No active hospital problems   *      Medications  Current Discharge Medication List      START taking these medications    Details   phenazopyridine (PYRIDIUM) 200 mg tablet Take 1 tablet (200 mg total) by mouth 3 (three) times a day as needed for bladder spasms (Dysuria)  Qty: 10 tablet, Refills: 0    Associated Diagnoses: Benign prostatic hyperplasia with urinary obstruction            Current Discharge Medication List      CONTINUE these medications which have NOT CHANGED    Details   aspirin 81 MG tablet Take 81 mg by mouth daily       cephalexin (KEFLEX) 500 mg capsule Take 1 capsule (500 mg total) by mouth every 12 (twelve) hours for 7 days  Qty: 14 capsule, Refills: 0    Associated Diagnoses: Acute cystitis without hematuria      Coenzyme Q10 (COQ-10) 200 MG CAPS Take by mouth daily       finasteride (PROSCAR) 5 mg tablet Take 1 tablet (5 mg total) by mouth daily  Qty: 90 tablet, Refills: 3    Associated Diagnoses: BPH with obstruction/lower urinary tract symptoms      latanoprost (XALATAN) 0 005 % ophthalmic solution Administer 1 drop to both eyes       levofloxacin (LEVAQUIN) 500 mg tablet Take 1 tablet (500 mg total) by mouth every 24 hours for 7 days  Qty: 7 tablet, Refills: 0    Associated Diagnoses: Acute cystitis without hematuria      Multiple Vitamin (MULTIVITAMIN) tablet Take 1 tablet by mouth daily      pravastatin (PRAVACHOL) 10 mg tablet Take 10 mg by mouth daily       ramipril (ALTACE) 10 MG capsule Take 10 mg by mouth daily       terazosin (HYTRIN) 5 mg capsule Take 5 mg by mouth daily at bedtime       clobetasol (TEMOVATE) 0 05 % cream Apply topically 2 (two) times a day as needed              Allergies  No Known Allergies    Outpatient Follow-Up  Yany Cespedes MD  [address] [place] [id number]  [phone]    Follow up  The office will call to arrange follow-up if you do not already have a  postop visit  ? Discharge Disposition  Home    Operative Procedures Performed  Procedure(s):  CYSTOSCOPY WITH INSERTION UROLIFT  ? Physical Exam at Discharge  Condition of Patient on Discharge: stable  ?   Yany Cespedes MD

## 2020-07-02 NOTE — INTERVAL H&P NOTE
H&P reviewed  After examining the patient I find no changes in the patients condition since the H&P had been written  Vitals:    07/02/20 0625   BP: 145/81   Pulse: 66   Resp: 18   Temp: (!) 97 2 °F (36 2 °C)   SpO2: 94%   urine culture treated preop  Appropriate antibiotic ordered  Procedure reviewed in holding unit preop

## 2020-07-02 NOTE — TELEPHONE ENCOUNTER
Patient  called to schedule his post op  Did not find appointment for suggested time frame  Please advise

## 2020-07-02 NOTE — ANESTHESIA PREPROCEDURE EVALUATION
Review of Systems/Medical History  Patient summary reviewed  Chart reviewed  No history of anesthetic complications     Cardiovascular  Hyperlipidemia, Hypertension controlled,    Pulmonary  Smoker ex-smoker  ,        GI/Hepatic    GERD well controlled,        Kidney stones, Prostatic disorder, benign prostatic hyperplasia       Endo/Other    Comment: psoriasis    GYN       Hematology  Negative hematology ROS      Musculoskeletal  Negative musculoskeletal ROS        Neurology  Negative neurology ROS   Visual impairment (glaucoma),    Psychology   Negative psychology ROS              Physical Exam    Airway    Mallampati score: II  TM Distance: >3 FB  Neck ROM: full     Dental   No notable dental hx     Cardiovascular  Rhythm: regular, Rate: normal, Cardiovascular exam normal    Pulmonary  Pulmonary exam normal Breath sounds clear to auscultation,     Other Findings        Anesthesia Plan  ASA Score- 2     Anesthesia Type- general and IV sedation with anesthesia with ASA Monitors  Additional Monitors:   Airway Plan:         Plan Factors-Patient not instructed to abstain from smoking on day of procedure  Patient did not smoke on day of surgery  Induction- intravenous  Postoperative Plan-     Informed Consent- Anesthetic plan and risks discussed with patient

## 2020-07-02 NOTE — TELEPHONE ENCOUNTER
Patient had urolift today  He will likely be discharged without a Nunes catheter  Please call to arrange a 4-6 week follow-up appointment with the physician assistant

## 2020-07-02 NOTE — OP NOTE
OPERATIVE REPORT  PATIENT NAME: Colton Redding    :  1950  MRN: [id number]  Pt Location: [facility] OR ROOM 07    SURGERY DATE: 2020    Surgeon(s) and Role:     * Marnie Prince MD - Primary    Preop Diagnosis:  Enlarged prostate with urinary obstruction [N40 1, N13 8]    Post-Op Diagnosis Codes:     * Enlarged prostate with urinary obstruction [N40 1, N13 8]    Procedure(s) (LRB):  CYSTOSCOPY WITH INSERTION UROLIFT (N/A)    Specimen(s):  * No specimens in log *    Estimated Blood Loss:   0 mL    Drains:  Urethral Catheter Double-lumen; Latex 24 Fr  (Active)   Number of days: 0       Anesthesia Type:   General    Operative Indications:  Enlarged prostate with urinary obstruction [N40 1, N13 8]      Operative Findings:  Normal urethra predominantly bilobar prostatic hypertrophy causing complete outflow obstruction  The prostatic urethra was long  The bladder was heavily trabeculated with cellule formation  Urolift implantation was successfully performed  4 implants were placed on the left side and 2 on the right with creation of an excellent anterior channel for urination  6 implants were used in all  Blood loss was minimal   A 24 [place] Nunes catheter was left at the conclusion the procedure  Complications:   None    Procedure and Technique:  The patient was brought to the operating room properly identified  General anesthesia was administered  He was placed in lithotomy position prepped draped usual sterile fashion  Compression boots were employed  Intravenous antibiotic was administered  An appropriate time-out was performed  Cystourethroscopy was performed with 20 [place] cystoscope with findings as above  The ureteral orifices, bladder neck and verumontanum were identified  Implants were placed at the 10 and 2 o'clock position approximately 1 1/2 cm back from the bladder neck  Implants were then placed in similar position at the level of the verumontanum    There was persistent left-sided prostatic obstruction bulging into the channel  This necessitated the placement of 2 implants in the mid gland at approximately the 3 o'clock position  This created an excellent anterior channel  Hemostasis was adequate and overall blood loss was minimal   A 24 [place] Nunes catheter was placed at the conclusion the procedure  The patient tolerated the procedure well  He was awakened from anesthesia and taken to the recovery room in satisfactory condition     I was present for the entire procedure    Patient Disposition:  PACU     SIGNATURE: Ramsey Corona MD  DATE: July 2, 2020  TIME: 8:57 AM

## 2020-07-02 NOTE — DISCHARGE INSTRUCTIONS
It is normal to experience discomfort after the urolift procedure  Use Tylenol or ibuprofen as your first-line pain medication  On occasion, some bloody discharge may be apparent from the penis  You may have soreness in the lower abdomen, and it may be uncomfortable to sit  You may experience the need to urinate more frequently and with greater urgency  These are all normal reactions to the procedure  Is important to take care of yourself  the next couple days to facilitate a rapid recovery  The  following are some suggestions:  1  Drink plenty of water  2   Take your medication as prescribed  3   If you have a Nunes catheter, do not engage in strenuous activity  You may shower but avoid a bath  4   No heavy lifting or strenuous activity for the next 2 weeks  Please call the office or seek medical attention for fever, excessive urinary bleeding, continuous bladder spasms, testicular swelling or inability to void

## 2020-07-02 NOTE — TELEPHONE ENCOUNTER
Patient scheduled for follow up with [facility] 8/7/2020 @ 9:15am  lmom with appt day and time

## 2020-08-07 ENCOUNTER — OFFICE VISIT (OUTPATIENT)
Dept: UROLOGY | Facility: MEDICAL CENTER | Age: 70
End: 2020-08-07
Payer: MEDICARE

## 2020-08-07 VITALS — HEART RATE: 93 BPM | SYSTOLIC BLOOD PRESSURE: 120 MMHG | TEMPERATURE: 96.9 F | DIASTOLIC BLOOD PRESSURE: 70 MMHG

## 2020-08-07 DIAGNOSIS — N40.1 BPH WITH OBSTRUCTION/LOWER URINARY TRACT SYMPTOMS: Primary | ICD-10-CM

## 2020-08-07 DIAGNOSIS — N13.8 BPH WITH OBSTRUCTION/LOWER URINARY TRACT SYMPTOMS: Primary | ICD-10-CM

## 2020-08-07 LAB — POST-VOID RESIDUAL VOLUME, ML POC: 10 ML

## 2020-08-07 PROCEDURE — 51798 US URINE CAPACITY MEASURE: CPT | Performed by: NURSE PRACTITIONER

## 2020-08-07 PROCEDURE — 99213 OFFICE O/P EST LOW 20 MIN: CPT | Performed by: NURSE PRACTITIONER

## 2020-08-07 RX ORDER — FINASTERIDE 5 MG/1
7.5 TABLET, FILM COATED ORAL DAILY
Qty: 90 TABLET | Refills: 3 | Status: SHIPPED | OUTPATIENT
Start: 2020-08-07 | End: 2020-08-07

## 2020-08-07 NOTE — PATIENT INSTRUCTIONS
Continue with Finasteride   Discontinue Terazosin  Monitor urinary symptoms  We will discuss being able to stop Finasteride at your next office visit if your urinary symptoms remain resolved  Call the office for concerns or questions  Continue to keep your self hydrated with at least 40-60 oz pf water per day  Decrease/Limiti the intake of bladder irritating foods and beverages

## 2020-08-07 NOTE — PROGRESS NOTES
8/7/2020      Chief Complaint   Patient presents with    Benign Prostatic Hypertrophy    Status post Uro lift     07/02/2020     Assessment and Plan    69 y o  male managed by Dr Payton Mendosa    1  Benign prostatic hyperplasia  · Status post cystoscopy with insertion of Uro lift 07/02/2020  · Bladder scan PVR 10 mL  · Uroflow- unable to urinate  · Bladder scan PVR urine dip next office visit  · Maintain adequate hydration upwards to 40-60 oz per day  · Avoid bladder irritating foods and beverages  · Discontinue terazosin  · Will continue finasteride  Will re-evaluate urinary symptoms at next appointment in 3 months and if urinary symptoms remain resolved will consider discontinuing Finasteride  Prescription for finasteride provided  · Follow up in the office in 3 months    History of Present Illness  Korin Moffett is a 69 y o  male here for follow up evaluation of urinary symptoms secondary to benign prostatic hyperplasia  Patient is status post cystoscopy and insertion of Uro lift 07/02/2020  Preoperatively, patient with multiple complaints of urinary frequency, nocturia and urinary urgency  He also complained of urinary dribbling, intermittent stream and weak urinary stream   Postoperatively, patient reports resolution of all lower urinary tract symptoms  He denies dysuria, hematuria, urinary frequency and urgency  Reports an increase in urinary stream   Urinary satisfied with his current urinary status  Patient continues to take terazosin and finasteride  He denies changes to his general health since his previous office visit/evaluation  Review of Systems   Constitutional: Negative for chills and fever  Respiratory: Negative for cough and shortness of breath  Cardiovascular: Negative for chest pain  Gastrointestinal: Negative for abdominal distention, abdominal pain, blood in stool, nausea and vomiting     Genitourinary: Negative for difficulty urinating, dysuria, enuresis, flank pain, frequency, hematuria and urgency  Musculoskeletal: Negative for back pain  Skin: Negative for rash  Neurological: Negative for dizziness       Past Medical History  Past Medical History:   Diagnosis Date    BPH with obstruction/lower urinary tract symptoms     Exercise involving walking     every other day/had been going to gym before pandemic    GERD (gastroesophageal reflux disease)     no problems now    Glaucoma     bilat    Gross hematuria     in the past    History of kidney stones     Hyperlipidemia     Hypertension     Kidney stone     Nocturia     Psoriasis     Teeth missing     Vitamin D deficiency, unspecified     Wears glasses        Past Social History  Past Surgical History:   Procedure Laterality Date    APPENDECTOMY      COLONOSCOPY      HAND SURGERY      trauma to 2nd and 3rd fingers    GA CYSTOURETHRO W/IMPLANT N/A 7/2/2020    Procedure: CYSTOSCOPY WITH INSERTION UROLIFT;  Surgeon: Luan Padron MD;  Location: [facility];  Service: Urology    ROTATOR CUFF REPAIR Right     TOOTH EXTRACTION       Social History     Tobacco Use   Smoking Status Former Smoker   Smokeless Tobacco Never Used       Past Family History  Family History   Problem Relation Age of Onset    Lung disease Father         black lung    Heart attack Mother        Past Social history  Social History     Socioeconomic History    Marital status: /Civil Union     Spouse name: Not on file    Number of children: Not on file    Years of education: Not on file    Highest education level: Not on file   Occupational History    Not on file   Social Needs    Financial resource strain: Not on file    Food insecurity     Worry: Not on file     Inability: Not on file    Transportation needs     Medical: Not on file     Non-medical: Not on file   Tobacco Use    Smoking status: Former Smoker    Smokeless tobacco: Never Used   Substance and Sexual Activity    Alcohol use: Yes     Frequency: Monthly or less     Comment: Caffeine daily    Drug use: No    Sexual activity: Not Currently   Lifestyle    Physical activity     Days per week: Not on file     Minutes per session: Not on file    Stress: Not on file   Relationships    Social connections     Talks on phone: Not on file     Gets together: Not on file     Attends [employer] service: Not on file     Active member of club or organization: Not on file     Attends meetings of clubs or organizations: Not on file     Relationship status: Not on file    Intimate partner violence     Fear of current or ex partner: Not on file     Emotionally abused: Not on file     Physically abused: Not on file     Forced sexual activity: Not on file   Other Topics Concern    Not on file   Social History Narrative    Not on file       Current Medications  Current Outpatient Medications   Medication Sig Dispense Refill    aspirin 81 MG tablet Take 81 mg by mouth daily       clobetasol (TEMOVATE) 0 05 % cream Apply topically 2 (two) times a day as needed       Coenzyme Q10 (COQ-10) 200 MG CAPS Take by mouth daily       latanoprost (XALATAN) 0 005 % ophthalmic solution Administer 1 drop to both eyes       Multiple Vitamin (MULTIVITAMIN) tablet Take 1 tablet by mouth daily      phenazopyridine (PYRIDIUM) 200 mg tablet Take 1 tablet (200 mg total) by mouth 3 (three) times a day as needed for bladder spasms (Dysuria) 10 tablet 0    pravastatin (PRAVACHOL) 10 mg tablet Take 10 mg by mouth daily       ramipril (ALTACE) 10 MG capsule Take 10 mg by mouth daily       terazosin (HYTRIN) 5 mg capsule Take 5 mg by mouth daily at bedtime        No current facility-administered medications for this visit          Allergies  No Known Allergies      The following portions of the patient's history were reviewed and updated as appropriate: allergies, current medications, past medical history, past social history, past surgical history and problem list       Vitals  Vitals:    08/07/20 9621 BP: 120/70   Pulse: 93   Temp: (!) 96 9 °F (36 1 °C)     Physical Exam  Physical Exam   Cardiovascular: Normal rate and regular rhythm  No murmur heard  Pulmonary/Chest: Effort normal and breath sounds normal  No respiratory distress  Abdominal: Normal appearance  Musculoskeletal: Normal range of motion  Neurological: He is alert  Skin: Skin is warm and dry  Psychiatric: Mood normal    Vitals reviewed  Results  No results found for this or any previous visit (from the past 1 hour(s))  ]  No results found for: PSA  Lab Results   Component Value Date    CALCIUM 8 9 06/25/2020    K 4 0 06/25/2020    CO2 24 06/25/2020     06/25/2020    BUN 14 06/25/2020    CREATININE 1 05 06/25/2020     Lab Results   Component Value Date    WBC 6 10 06/25/2020    HGB 15 2 06/25/2020    HCT 45 2 06/25/2020    MCV 93 06/25/2020     06/25/2020     Orders  Orders Placed This Encounter   Procedures    POCT Measure PVR       JORGE L Hartman

## 2020-11-10 ENCOUNTER — OFFICE VISIT (OUTPATIENT)
Dept: UROLOGY | Facility: MEDICAL CENTER | Age: 70
End: 2020-11-10
Payer: MEDICARE

## 2020-11-10 VITALS
WEIGHT: 207 LBS | DIASTOLIC BLOOD PRESSURE: 76 MMHG | SYSTOLIC BLOOD PRESSURE: 112 MMHG | TEMPERATURE: 97.7 F | BODY MASS INDEX: 28.98 KG/M2 | HEIGHT: 71 IN

## 2020-11-10 DIAGNOSIS — N40.1 BPH WITH OBSTRUCTION/LOWER URINARY TRACT SYMPTOMS: Primary | ICD-10-CM

## 2020-11-10 DIAGNOSIS — N13.8 BPH WITH OBSTRUCTION/LOWER URINARY TRACT SYMPTOMS: Primary | ICD-10-CM

## 2020-11-10 PROCEDURE — 99213 OFFICE O/P EST LOW 20 MIN: CPT | Performed by: UROLOGY

## 2021-05-03 RX ORDER — METOPROLOL TARTRATE 50 MG/1
50 TABLET, FILM COATED ORAL DAILY
COMMUNITY
Start: 2021-01-15 | End: 2022-01-15

## 2021-05-13 RX ORDER — ALBUTEROL SULFATE 90 UG/1
2 AEROSOL, METERED RESPIRATORY (INHALATION) EVERY 4 HOURS PRN
COMMUNITY
Start: 2021-05-11 | End: 2022-05-11

## 2021-05-13 RX ORDER — PREDNISONE 20 MG/1
40 TABLET ORAL DAILY
COMMUNITY
Start: 2021-05-11

## 2021-05-20 ENCOUNTER — OFFICE VISIT (OUTPATIENT)
Dept: UROLOGY | Facility: MEDICAL CENTER | Age: 71
End: 2021-05-20
Payer: MEDICARE

## 2021-05-20 VITALS
WEIGHT: 202 LBS | DIASTOLIC BLOOD PRESSURE: 60 MMHG | HEART RATE: 59 BPM | BODY MASS INDEX: 28.28 KG/M2 | SYSTOLIC BLOOD PRESSURE: 100 MMHG | HEIGHT: 71 IN

## 2021-05-20 DIAGNOSIS — N13.8 BPH WITH OBSTRUCTION/LOWER URINARY TRACT SYMPTOMS: Primary | ICD-10-CM

## 2021-05-20 DIAGNOSIS — N40.1 BPH WITH OBSTRUCTION/LOWER URINARY TRACT SYMPTOMS: Primary | ICD-10-CM

## 2021-05-20 LAB
BACTERIA UR QL AUTO: ABNORMAL /HPF
BILIRUB UR QL STRIP: NEGATIVE
CLARITY UR: CLEAR
COLOR UR: YELLOW
GLUCOSE UR STRIP-MCNC: NEGATIVE MG/DL
HGB UR QL STRIP.AUTO: NEGATIVE
HYALINE CASTS #/AREA URNS LPF: ABNORMAL /LPF
KETONES UR STRIP-MCNC: NEGATIVE MG/DL
LEUKOCYTE ESTERASE UR QL STRIP: ABNORMAL
NITRITE UR QL STRIP: NEGATIVE
NON-SQ EPI CELLS URNS QL MICRO: ABNORMAL /HPF
PH UR STRIP.AUTO: 7 [PH]
POST-VOID RESIDUAL VOLUME, ML POC: 23 ML
PROT UR STRIP-MCNC: NEGATIVE MG/DL
RBC #/AREA URNS AUTO: ABNORMAL /HPF
SL AMB  POCT GLUCOSE, UA: ABNORMAL
SL AMB LEUKOCYTE ESTERASE,UA: ABNORMAL
SL AMB POCT BILIRUBIN,UA: ABNORMAL
SL AMB POCT BLOOD,UA: ABNORMAL
SL AMB POCT CLARITY,UA: CLEAR
SL AMB POCT COLOR,UA: YELLOW
SL AMB POCT KETONES,UA: ABNORMAL
SL AMB POCT NITRITE,UA: ABNORMAL
SL AMB POCT PH,UA: 7
SL AMB POCT SPECIFIC GRAVITY,UA: 1.01
SL AMB POCT URINE PROTEIN: ABNORMAL
SL AMB POCT UROBILINOGEN: 0.2
SP GR UR STRIP.AUTO: 1.01 (ref 1–1.03)
UROBILINOGEN UR QL STRIP.AUTO: 0.2 E.U./DL
WBC #/AREA URNS AUTO: ABNORMAL /HPF

## 2021-05-20 PROCEDURE — 87147 CULTURE TYPE IMMUNOLOGIC: CPT | Performed by: NURSE PRACTITIONER

## 2021-05-20 PROCEDURE — 99213 OFFICE O/P EST LOW 20 MIN: CPT | Performed by: NURSE PRACTITIONER

## 2021-05-20 PROCEDURE — 81003 URINALYSIS AUTO W/O SCOPE: CPT | Performed by: NURSE PRACTITIONER

## 2021-05-20 PROCEDURE — 51798 US URINE CAPACITY MEASURE: CPT | Performed by: NURSE PRACTITIONER

## 2021-05-20 PROCEDURE — 81001 URINALYSIS AUTO W/SCOPE: CPT | Performed by: NURSE PRACTITIONER

## 2021-05-20 PROCEDURE — 87086 URINE CULTURE/COLONY COUNT: CPT | Performed by: NURSE PRACTITIONER

## 2021-05-20 NOTE — PROGRESS NOTES
5/20/2021    Assessment and Plan    69 y o  male managed by Dr Jerry Flash    1  Benign prostatic hyperplasia with lower urinary tract symptoms  ·  bladder scan PVR 23 mL  ·  urine dip in office positive for small leukocytes otherwise unremarkable  ·  status post cystoscopy with insertion of Uro lift 07/02/2020  · Will discontinue terazosin and finasteride- pt has not stopped as previously directed  · Maintain hydration upwards to 40 oz per day  · Avoid/Limit the intake of bladder irritating foods and beverages  · Timed voiding  · Ensure complete bladder emptying with urination  · Follow up in the office in 1 year      History of Present Illness  Colton Redding is a 69 y o  male here for follow up evaluation of urinary symptoms secondary to benign prostatic hyperplasia  Patient is status post cystoscopy and insertion of Uro lift 07/02/2020  Preoperatively, patient with multiple complaints of urinary frequency, nocturia and urinary urgency  He also complained of urinary dribbling, intermittent stream and weak urinary stream   Postoperatively, patient reports resolution of all lower urinary tract symptoms  He denies dysuria, hematuria, urinary frequency and urgency  Reports an increase in urinary stream   Urinary satisfied with his current urinary status  Patient continues to take terazosin and finasteride  He denies changes to his general health since his previous office visit/evaluation  Recent dx atrial fibrillation with syncope  Started on Eliquis and metoprolol  Review of Systems   Constitutional: Negative for chills and fever  Respiratory: Negative for cough and shortness of breath  Cardiovascular: Negative for chest pain  Gastrointestinal: Negative for abdominal distention, abdominal pain, blood in stool, nausea and vomiting  Genitourinary: Negative for difficulty urinating, dysuria, enuresis, flank pain, frequency, hematuria and urgency  Musculoskeletal: Negative for back pain  Skin: Negative for rash  Neurological: Negative for dizziness  AUA SYMPTOM SCORE      Most Recent Value   AUA SYMPTOM SCORE   How often have you had a sensation of not emptying your bladder completely after you finished urinating? 1   How often have you had to urinate again less than two hours after you finished urinating? 0   How often have you found you stopped and started again several times when you urinate?  0   How often have you found it difficult to postpone urination? 1   How often have you had a weak urinary stream?  1   How often have you had to push or strain to begin urination? 0   How many times did you most typically get up to urinate from the time you went to bed at night until the time you got up in the morning?   2   Quality of Life: If you were to spend the rest of your life with your urinary condition just the way it is now, how would you feel about that?  1   AUA SYMPTOM SCORE  5             Past Medical History  Past Medical History:   Diagnosis Date    BPH with obstruction/lower urinary tract symptoms     Exercise involving walking     every other day/had been going to gym before pandemic    GERD (gastroesophageal reflux disease)     no problems now    Glaucoma     bilat    Gross hematuria     in the past    History of kidney stones     Hyperlipidemia     Hypertension     Kidney stone     Nocturia     Psoriasis     Teeth missing     Vitamin D deficiency, unspecified     Wears glasses        Past Social History  Past Surgical History:   Procedure Laterality Date    APPENDECTOMY      COLONOSCOPY      HAND SURGERY      trauma to 2nd and 3rd fingers    NH CYSTOURETHRO W/IMPLANT N/A 7/2/2020    Procedure: CYSTOSCOPY WITH INSERTION Hossein Rosa;  Surgeon: Marnie Prince MD;  Location: [facility];  Service: Urology    ROTATOR CUFF REPAIR Right     TOOTH EXTRACTION       Social History     Tobacco Use   Smoking Status Former Smoker   Smokeless Tobacco Never Used       Past Family History  Family History   Problem Relation Age of Onset    Lung disease Father         black lung    Heart attack Mother        Past Social history  Social History     Socioeconomic History    Marital status: /Civil Union     Spouse name: Not on file    Number of children: Not on file    Years of education: Not on file    Highest education level: Not on file   Occupational History    Not on file   Social Needs    Financial resource strain: Not on file    Food insecurity     Worry: Not on file     Inability: Not on file   [employer] needs     Medical: Not on file     Non-medical: Not on file   Tobacco Use    Smoking status: Former Smoker    Smokeless tobacco: Never Used   Substance and Sexual Activity    Alcohol use: Yes     Frequency: Monthly or less     Comment: Caffeine daily    Drug use: No    Sexual activity: Not Currently   Lifestyle    Physical activity     Days per week: Not on file     Minutes per session: Not on file    Stress: Not on file   Relationships    Social connections     Talks on phone: Not on file     Gets together: Not on file     Attends Shinto service: Not on file     Active member of club or organization: Not on file     Attends meetings of clubs or organizations: Not on file     Relationship status: Not on file    Intimate partner violence     Fear of current or ex partner: Not on file     Emotionally abused: Not on file     Physically abused: Not on file     Forced sexual activity: Not on file   Other Topics Concern    Not on file   Social History Narrative    Not on file       Current Medications  Current Outpatient Medications   Medication Sig Dispense Refill    apixaban (ELIQUIS) 5 mg Take 5 mg by mouth 2 (two) times a day      Coenzyme Q10 (COQ-10) 200 MG CAPS Take by mouth daily       latanoprost (XALATAN) 0 005 % ophthalmic solution Administer 1 drop to both eyes       metoprolol tartrate (LOPRESSOR) 50 mg tablet Take 50 mg by mouth daily  Multiple Vitamin (MULTIVITAMIN) tablet Take 1 tablet by mouth daily      pravastatin (PRAVACHOL) 10 mg tablet Take 10 mg by mouth daily       ramipril (ALTACE) 10 MG capsule Take 10 mg by mouth daily       terazosin (HYTRIN) 5 mg capsule Take 5 mg by mouth daily at bedtime       albuterol (PROVENTIL HFA,VENTOLIN HFA) 90 mcg/act inhaler Inhale 2 puffs every 4 (four) hours as needed      aspirin 81 MG tablet Take 81 mg by mouth daily       clobetasol (TEMOVATE) 0 05 % cream Apply topically 2 (two) times a day as needed       predniSONE 20 mg tablet Take 40 mg by mouth daily      triamcinolone (KENALOG) 0 1 % ointment        No current facility-administered medications for this visit  Allergies  No Known Allergies      The following portions of the patient's history were reviewed and updated as appropriate: allergies, current medications, past medical history, past social history, past surgical history and problem list       Vitals  Vitals:    05/20/21 0808   BP: 100/60   Pulse: 59   Weight: 91 6 kg (202 lb)   Height: 5' 10 5" (1 791 m)     Physical Exam  Physical Exam  Vitals signs reviewed  Constitutional:       General: He is not in acute distress  Appearance: Normal appearance  He is normal weight  Pulmonary:      Effort: No respiratory distress  Breath sounds: Normal breath sounds  Skin:     General: Skin is warm and dry  Neurological:      General: No focal deficit present  Mental Status: He is alert and oriented to person, place, and time     Psychiatric:         Mood and Affect: Mood normal          Behavior: Behavior normal        Results  Recent Results (from the past 1 hour(s))   POCT Measure PVR    Collection Time: 05/20/21  8:17 AM   Result Value Ref Range    POST-VOID RESIDUAL VOLUME, ML POC 23 mL   POCT urine dip auto non-scope    Collection Time: 05/20/21  8:22 AM   Result Value Ref Range     COLOR,UA yellow     CLARITY,UA clear     SPECIFIC GRAVITY,UA 1 015 PH,UA 7 0     LEUKOCYTE ESTERASE,UA small     NITRITE,UA neg     GLUCOSE, UA neg     KETONES,UA neg     BILIRUBIN,UA neg     BLOOD,UA neg     POCT URINE PROTEIN neg     SL AMB POCT UROBILINOGEN 0 2    ]  No results found for: PSA  Lab Results   Component Value Date    CALCIUM 8 9 06/25/2020    K 4 0 06/25/2020    CO2 24 06/25/2020     06/25/2020    BUN 14 06/25/2020    CREATININE 1 05 06/25/2020     Lab Results   Component Value Date    WBC 6 10 06/25/2020    HGB 15 2 06/25/2020    HCT 45 2 06/25/2020    MCV 93 06/25/2020     06/25/2020     Orders  Orders Placed This Encounter   Procedures    Urine culture     Order Specific Question:   Release to patient through Mychart     Answer:   Immediate    Urinalysis with microscopic    POCT Measure PVR    POCT urine dip auto non-scope       JORGE L Cee

## 2021-05-20 NOTE — PATIENT INSTRUCTIONS
· Will discontinue terazosin and finasteride  · Maintain hydration upwards to 40 oz per day  · Avoid/Limit the intake of bladder irritating foods and beverages  · Timed voiding  · Ensure complete bladder emptying with urination  · Follow up in the office in 1 year  · Call the office for questions or concerns

## 2021-05-21 ENCOUNTER — TELEPHONE (OUTPATIENT)
Dept: UROLOGY | Facility: MEDICAL CENTER | Age: 71
End: 2021-05-21

## 2021-05-21 DIAGNOSIS — N39.0 URINARY TRACT INFECTION WITHOUT HEMATURIA, SITE UNSPECIFIED: Primary | ICD-10-CM

## 2021-05-21 LAB
BACTERIA UR CULT: ABNORMAL
BACTERIA UR CULT: ABNORMAL

## 2021-05-21 RX ORDER — AMOXICILLIN 500 MG/1
500 CAPSULE ORAL EVERY 8 HOURS SCHEDULED
Qty: 21 CAPSULE | Refills: 0 | Status: SHIPPED | OUTPATIENT
Start: 2021-05-21 | End: 2021-05-21 | Stop reason: SDUPTHER

## 2021-05-21 RX ORDER — AMOXICILLIN 500 MG/1
500 CAPSULE ORAL EVERY 8 HOURS SCHEDULED
Qty: 21 CAPSULE | Refills: 0 | Status: SHIPPED | OUTPATIENT
Start: 2021-05-21 | End: 2021-05-28

## 2021-05-21 NOTE — TELEPHONE ENCOUNTER
Call placed to patient and spoke with him  Informed him of the recommendations of the CRNP and that prescription was sent to his pharmacy

## 2021-12-02 PROCEDURE — 88305 TISSUE EXAM BY PATHOLOGIST: CPT | Performed by: PATHOLOGY

## 2021-12-03 ENCOUNTER — LAB REQUISITION (OUTPATIENT)
Dept: LAB | Facility: HOSPITAL | Age: 71
End: 2021-12-03
Payer: MEDICARE

## 2021-12-03 DIAGNOSIS — Z12.11 ENCOUNTER FOR SCREENING FOR MALIGNANT NEOPLASM OF COLON: ICD-10-CM

## 2021-12-03 DIAGNOSIS — K63.5 POLYP OF COLON: ICD-10-CM

## 2022-05-31 ENCOUNTER — TELEPHONE (OUTPATIENT)
Dept: OTHER | Facility: OTHER | Age: 72
End: 2022-05-31

## 2022-05-31 DIAGNOSIS — N13.8 BPH WITH OBSTRUCTION/LOWER URINARY TRACT SYMPTOMS: Primary | ICD-10-CM

## 2022-05-31 DIAGNOSIS — N40.1 BPH WITH OBSTRUCTION/LOWER URINARY TRACT SYMPTOMS: Primary | ICD-10-CM

## 2022-05-31 NOTE — TELEPHONE ENCOUNTER
Patient has an appointment with Dr Shiv Brandt on 6/14 and he hasn't had a PSA test done in about a year  He wants to know if the doctor would like him to get one before he sees him at the appointment  Patient is requesting a call back  He would also need an order placed in epic

## 2022-05-31 NOTE — TELEPHONE ENCOUNTER
Call placed to patient and spoke with him  Informed him that MD would like PSA level completed prior to appointment  He is aware and will stop by the office later today to  PSA script since he uses outside lab center

## 2022-06-14 ENCOUNTER — OFFICE VISIT (OUTPATIENT)
Dept: UROLOGY | Facility: MEDICAL CENTER | Age: 72
End: 2022-06-14
Payer: MEDICARE

## 2022-06-14 VITALS
HEART RATE: 63 BPM | WEIGHT: 209 LBS | OXYGEN SATURATION: 98 % | DIASTOLIC BLOOD PRESSURE: 70 MMHG | HEIGHT: 70 IN | SYSTOLIC BLOOD PRESSURE: 104 MMHG | BODY MASS INDEX: 29.92 KG/M2

## 2022-06-14 DIAGNOSIS — N13.8 BPH WITH OBSTRUCTION/LOWER URINARY TRACT SYMPTOMS: Primary | ICD-10-CM

## 2022-06-14 DIAGNOSIS — R30.0 DYSURIA: ICD-10-CM

## 2022-06-14 DIAGNOSIS — N40.1 BPH WITH OBSTRUCTION/LOWER URINARY TRACT SYMPTOMS: Primary | ICD-10-CM

## 2022-06-14 PROBLEM — R31.0 GROSS HEMATURIA: Status: RESOLVED | Noted: 2018-02-14 | Resolved: 2022-06-14

## 2022-06-14 LAB
POST-VOID RESIDUAL VOLUME, ML POC: 55 ML
SL AMB  POCT GLUCOSE, UA: ABNORMAL
SL AMB LEUKOCYTE ESTERASE,UA: ABNORMAL
SL AMB POCT BILIRUBIN,UA: ABNORMAL
SL AMB POCT BLOOD,UA: ABNORMAL
SL AMB POCT CLARITY,UA: ABNORMAL
SL AMB POCT COLOR,UA: YELLOW
SL AMB POCT KETONES,UA: ABNORMAL
SL AMB POCT NITRITE,UA: POSITIVE
SL AMB POCT PH,UA: 7
SL AMB POCT SPECIFIC GRAVITY,UA: 1.01
SL AMB POCT URINE PROTEIN: ABNORMAL
SL AMB POCT UROBILINOGEN: 0.2

## 2022-06-14 PROCEDURE — 51798 US URINE CAPACITY MEASURE: CPT | Performed by: UROLOGY

## 2022-06-14 PROCEDURE — 87077 CULTURE AEROBIC IDENTIFY: CPT | Performed by: UROLOGY

## 2022-06-14 PROCEDURE — 81003 URINALYSIS AUTO W/O SCOPE: CPT | Performed by: UROLOGY

## 2022-06-14 PROCEDURE — 99214 OFFICE O/P EST MOD 30 MIN: CPT | Performed by: UROLOGY

## 2022-06-14 PROCEDURE — 87186 SC STD MICRODIL/AGAR DIL: CPT | Performed by: UROLOGY

## 2022-06-14 PROCEDURE — 87086 URINE CULTURE/COLONY COUNT: CPT | Performed by: UROLOGY

## 2022-06-14 RX ORDER — CEPHALEXIN 500 MG/1
500 CAPSULE ORAL EVERY 12 HOURS SCHEDULED
Qty: 20 CAPSULE | Refills: 0 | Status: SHIPPED | OUTPATIENT
Start: 2022-06-14 | End: 2022-06-24

## 2022-06-14 RX ORDER — METOPROLOL TARTRATE 50 MG/1
50 TABLET, FILM COATED ORAL DAILY
COMMUNITY

## 2022-06-14 NOTE — PROGRESS NOTES
Assessment/Plan:    BPH with obstruction/lower urinary tract symptoms  AUA symptom score is 20  He is not happy with his voiding pattern  PSA in June was 2 38  Digital rectal examination is benign in nature  Postvoid residual is acceptable  Urinalysis is positive for leukocytes and nitrites  We will plan to treat empirically with Keflex 500 mg twice daily times 10 days pending urine culture  We will obtain a renal and bladder ultrasound  He will return for cystoscopy  Dysuria  As above  Diagnoses and all orders for this visit:    BPH with obstruction/lower urinary tract symptoms  -     Cancel: Urine culture; Future  -     Urine culture  -     US kidney and bladder; Future  -     Cystoscopy; Future    Dysuria  -     POCT urine dip auto non-scope  -     POCT Measure PVR  -     US kidney and bladder; Future  -     cephalexin (KEFLEX) 500 mg capsule; Take 1 capsule (500 mg total) by mouth every 12 (twelve) hours for 10 days    Other orders  -     apixaban (Eliquis) 5 mg; Take 5 mg by mouth 2 (two) times a day  -     metoprolol tartrate (LOPRESSOR) 25 mg tablet; Take 25 mg by mouth in the morning  -     metoprolol tartrate (LOPRESSOR) 50 mg tablet; Take 50 mg by mouth in the morning          Subjective:      Patient ID: Fidel Lawler is a 66 y o  male  Benign Prostatic Hypertrophy  This is a chronic problem  The current episode started more than 1 year ago  The problem has been gradually worsening since onset  Irritative symptoms include frequency and nocturia (4-5x)  Irritative symptoms do not include urgency  Obstructive symptoms include dribbling  Obstructive symptoms do not include incomplete emptying, an intermittent stream, a slower stream, straining or a weak stream  Associated symptoms include dysuria  Pertinent negatives include no chills, genital pain, hematuria, hesitancy, nausea or vomiting  AUA score is 20-35  His sexual activity is non-contributory to the current illness   Nothing aggravates the symptoms  The following portions of the patient's history were reviewed and updated as appropriate: allergies, current medications, past family history, past medical history, past social history, past surgical history and problem list     Review of Systems   Constitutional: Negative for chills  Gastrointestinal: Negative for nausea and vomiting  Genitourinary: Positive for dysuria, frequency and nocturia (4-5x)  Negative for hematuria, hesitancy, incomplete emptying and urgency  AUA SYMPTOM SCORE    Flowsheet Row Most Recent Value   AUA SYMPTOM SCORE    How often have you had a sensation of not emptying your bladder completely after you finished urinating? 2   How often have you had to urinate again less than two hours after you finished urinating? 5   How often have you found you stopped and started again several times when you urinate? 2   How often have you found it difficult to postpone urination? 4   How often have you had a weak urinary stream? 2   How often have you had to push or strain to begin urination? 0   How many times did you most typically get up to urinate from the time you went to bed at night until the time you got up in the morning? 5   Quality of Life: If you were to spend the rest of your life with your urinary condition just the way it is now, how would you feel about that? 6   AUA SYMPTOM SCORE 20        Objective:      /70 (BP Location: Left arm, Patient Position: Sitting, Cuff Size: Large)   Pulse 63   Ht 5' 10" (1 778 m)   Wt 94 8 kg (209 lb)   SpO2 98%   BMI 29 99 kg/m²          Physical Exam  Vitals reviewed  Constitutional:       General: He is not in acute distress  Appearance: Normal appearance  He is well-developed  He is not ill-appearing, toxic-appearing or diaphoretic  HENT:      Head: Normocephalic and atraumatic  Eyes:      General: No scleral icterus       Conjunctiva/sclera: Conjunctivae normal    Cardiovascular:      Rate and Rhythm: Normal rate  Pulmonary:      Effort: Pulmonary effort is normal    Abdominal:      General: Bowel sounds are normal  There is no distension  Palpations: Abdomen is soft  There is no mass  Tenderness: There is no abdominal tenderness  There is right CVA tenderness (mild)  There is no left CVA tenderness, guarding or rebound  Hernia: No hernia is present  Genitourinary:     Penis: Normal  No phimosis or hypospadias  Testes: Normal          Right: Mass not present  Left: Mass not present  Rectum: Normal       Comments: Prostate 3+ enlarged, nontender and palpably benign  Musculoskeletal:         General: Normal range of motion  Cervical back: Neck supple  Skin:     General: Skin is warm and dry  Neurological:      General: No focal deficit present  Mental Status: He is alert and oriented to person, place, and time  Psychiatric:         Mood and Affect: Mood normal          Behavior: Behavior normal          Thought Content:  Thought content normal          Judgment: Judgment normal

## 2022-06-14 NOTE — ASSESSMENT & PLAN NOTE
AUA symptom score is 20  He is not happy with his voiding pattern  PSA in June was 2 38  Digital rectal examination is benign in nature  Postvoid residual is acceptable  Urinalysis is positive for leukocytes and nitrites  We will plan to treat empirically with Keflex 500 mg twice daily times 10 days pending urine culture  We will obtain a renal and bladder ultrasound  He will return for cystoscopy

## 2022-06-16 LAB — BACTERIA UR CULT: ABNORMAL

## 2022-06-29 ENCOUNTER — HOSPITAL ENCOUNTER (OUTPATIENT)
Dept: ULTRASOUND IMAGING | Facility: MEDICAL CENTER | Age: 72
Discharge: HOME | End: 2022-06-29
Attending: UROLOGY
Payer: MEDICARE

## 2022-06-29 DIAGNOSIS — R30.0 DYSURIA: ICD-10-CM

## 2022-06-29 DIAGNOSIS — N13.8 BPH WITH OBSTRUCTION/LOWER URINARY TRACT SYMPTOMS: ICD-10-CM

## 2022-06-29 DIAGNOSIS — N40.1 BPH WITH OBSTRUCTION/LOWER URINARY TRACT SYMPTOMS: ICD-10-CM

## 2022-06-29 PROCEDURE — 76770 US EXAM ABDO BACK WALL COMP: CPT

## 2022-08-08 ENCOUNTER — PROCEDURE VISIT (OUTPATIENT)
Dept: UROLOGY | Facility: MEDICAL CENTER | Age: 72
End: 2022-08-08
Payer: MEDICARE

## 2022-08-08 VITALS
BODY MASS INDEX: 28.92 KG/M2 | DIASTOLIC BLOOD PRESSURE: 72 MMHG | HEIGHT: 70 IN | SYSTOLIC BLOOD PRESSURE: 114 MMHG | WEIGHT: 202 LBS

## 2022-08-08 DIAGNOSIS — N40.1 BPH WITH OBSTRUCTION/LOWER URINARY TRACT SYMPTOMS: Primary | ICD-10-CM

## 2022-08-08 DIAGNOSIS — Z87.440 HISTORY OF URINARY TRACT INFECTION: ICD-10-CM

## 2022-08-08 DIAGNOSIS — N13.8 BPH WITH OBSTRUCTION/LOWER URINARY TRACT SYMPTOMS: Primary | ICD-10-CM

## 2022-08-08 LAB
SL AMB  POCT GLUCOSE, UA: ABNORMAL
SL AMB LEUKOCYTE ESTERASE,UA: ABNORMAL
SL AMB POCT BILIRUBIN,UA: ABNORMAL
SL AMB POCT BLOOD,UA: ABNORMAL
SL AMB POCT CLARITY,UA: CLEAR
SL AMB POCT COLOR,UA: YELLOW
SL AMB POCT KETONES,UA: ABNORMAL
SL AMB POCT NITRITE,UA: ABNORMAL
SL AMB POCT PH,UA: 6.5
SL AMB POCT SPECIFIC GRAVITY,UA: 1.01
SL AMB POCT URINE PROTEIN: ABNORMAL
SL AMB POCT UROBILINOGEN: 0.2

## 2022-08-08 PROCEDURE — 52000 CYSTOURETHROSCOPY: CPT | Performed by: UROLOGY

## 2022-08-08 PROCEDURE — 81003 URINALYSIS AUTO W/O SCOPE: CPT | Performed by: UROLOGY

## 2022-08-08 PROCEDURE — 99214 OFFICE O/P EST MOD 30 MIN: CPT | Performed by: UROLOGY

## 2022-08-08 RX ORDER — FEXOFENADINE HCL 180 MG/1
180 TABLET ORAL 2 TIMES DAILY
COMMUNITY
Start: 2022-06-23

## 2022-08-08 RX ORDER — TAMSULOSIN HYDROCHLORIDE 0.4 MG/1
0.4 CAPSULE ORAL
Qty: 90 CAPSULE | Refills: 3 | Status: SHIPPED | OUTPATIENT
Start: 2022-08-08

## 2022-08-08 RX ORDER — FINASTERIDE 5 MG/1
5 TABLET, FILM COATED ORAL DAILY
Qty: 90 TABLET | Refills: 3 | Status: SHIPPED | OUTPATIENT
Start: 2022-08-08

## 2022-08-08 NOTE — ASSESSMENT & PLAN NOTE
Urinalysis today is unremarkable  Upper tracts were unremarkable by ultrasound  Prostate is large and incomplete emptying likely contributed to his infection  See above

## 2022-08-08 NOTE — ASSESSMENT & PLAN NOTE
AUA symptom score was 20  The patient is voiding a little bit better since being treated with antibiotic  Prostate size is estimated 120 g on ultrasound  His postvoid residual is moderate on cystoscopy today  Options were discussed with the patient  He is not really interested in a in the procedure  I recommended that he begin combined medical therapy with tamsulosin and finasteride to help improve his voiding pattern and perhaps decrease prostate size and vascularity

## 2022-08-08 NOTE — LETTER
August 8, 2022     DO Ezra Ross   [address]  [address] [address]    Patient: Lidya Seth   YOB: 1950   Date of Visit: 8/8/2022       Dear Dr Montes Ek: Thank you for referring Lidya Seth to me for evaluation  Below are my notes for this consultation  If you have questions, please do not hesitate to call me  I look forward to following your patient along with you  Sincerely,        Leona Medina MD        CC: No Recipients  Leona Medina MD  8/8/2022 10:57 AM  Sign when Signing Visit  Assessment/Plan:    BPH with obstruction/lower urinary tract symptoms  AUA symptom score was 20  The patient is voiding a little bit better since being treated with antibiotic  Prostate size is estimated 120 g on ultrasound  His postvoid residual is moderate on cystoscopy today  Options were discussed with the patient  He is not really interested in a in the procedure  I recommended that he begin combined medical therapy with tamsulosin and finasteride to help improve his voiding pattern and perhaps decrease prostate size and vascularity  History of urinary tract infection  Urinalysis today is unremarkable  Upper tracts were unremarkable by ultrasound  Prostate is large and incomplete emptying likely contributed to his infection  See above  Diagnoses and all orders for this visit:    BPH with obstruction/lower urinary tract symptoms  -     POCT urine dip auto non-scope  -     tamsulosin (FLOMAX) 0 4 mg; Take 1 capsule (0 4 mg total) by mouth daily with dinner  -     finasteride (PROSCAR) 5 mg tablet; Take 1 tablet (5 mg total) by mouth daily  -     Cystoscopy    History of urinary tract infection    Other orders  -     fexofenadine (ALLEGRA) 180 MG tablet; Take 180 mg by mouth 2 (two) times a day          Subjective:      Patient ID: Lidya Seth is a 67 y o  male      Chief complaint:  Lower urinary tract symptoms secondary to BPH, history of UTI    HPI:  62-year-old male followed for the above complaints  His last office visit he was not happy with his voiding pattern  He was found to have pyuria and a urine culture did grow Klebsiella  He was treated with antibiotic  He reports that since being on the antibiotic he is voiding a little bit better  At this point he is satisfied with his voiding pattern  He denies gross hematuria or dysuria at this time  He returns today for cystoscopy in follow-up  The following portions of the patient's history were reviewed and updated as appropriate: allergies, current medications, past family history, past medical history, past social history, past surgical history and problem list     Review of Systems   Constitutional: Negative for chills, diaphoresis, fatigue and fever  HENT: Negative  Eyes: Negative  Respiratory: Negative  Cardiovascular: Negative  Endocrine: Negative  Genitourinary:        See HPI   Musculoskeletal: Negative  Skin: Negative  Allergic/Immunologic: Negative  Neurological: Negative  Hematological: Negative  Psychiatric/Behavioral: Negative  Objective:      /72   Ht 5' 10" (1 778 m)   Wt 91 6 kg (202 lb)   BMI 28 98 kg/m²          Physical Exam  Vitals reviewed  Constitutional:       General: He is not in acute distress  Appearance: Normal appearance  He is well-developed and normal weight  He is not ill-appearing, toxic-appearing or diaphoretic  HENT:      Head: Normocephalic and atraumatic  Eyes:      General: No scleral icterus  Conjunctiva/sclera: Conjunctivae normal    Cardiovascular:      Rate and Rhythm: Normal rate  Pulmonary:      Effort: Pulmonary effort is normal    Abdominal:      General: There is no distension  Palpations: There is no mass  Tenderness: There is no abdominal tenderness  There is no right CVA tenderness, left CVA tenderness, guarding or rebound  Hernia: No hernia is present  Comments: No hernia   Genitourinary:     Penis: Normal        Testes: Normal    Musculoskeletal:      Cervical back: Neck supple  Skin:     General: Skin is warm and dry  Neurological:      General: No focal deficit present  Mental Status: He is alert and oriented to person, place, and time  Psychiatric:         Mood and Affect: Mood normal          Behavior: Behavior normal          Thought Content: Thought content normal          Judgment: Judgment normal             Cystoscopy     Date/Time 8/8/2022 10:50 AM     Performed by  Emil Noland MD     Authorized by Emil Noland MD      Universal Protocol:  Consent: Verbal consent obtained  Written consent obtained  Risks and benefits: risks, benefits and alternatives were discussed  Consent given by: patient  Patient understanding: patient states understanding of the procedure being performed  Patient consent: the patient's understanding of the procedure matches consent given  Procedure consent: procedure consent matches procedure scheduled  Patient identity confirmed: verbally with patient        Procedure Details:  Procedure type: cystoscopy    Patient tolerance: Patient tolerated the procedure well with no immediate complications    Additional Procedure Details:      Patient presents for cystoscopy  I have discussed the reasons for doing the test, and the potential risks and complications  Patient expressed understanding, and signed informed consent document  The patient was carefully  positioned supine on the examining table  Sterile preparation was performed on the urethra  Xylocaine jelly was instilled and left  Indwelling for the procedure  The 13 Hebrew flexible cystoscope was passed with the following findings:      Urethra:  Normal urethra    Prostate:  lateral lobes -bilobar obstruction approximately 80 g                  Bladder:  Mild trabeculation, no lesions, tumor, or stones  Residual urine:   Moderate    Patient tolerated the procedure well and was escorted from the examining table

## 2022-08-08 NOTE — PROGRESS NOTES
Assessment/Plan:    BPH with obstruction/lower urinary tract symptoms  AUA symptom score was 20  The patient is voiding a little bit better since being treated with antibiotic  Prostate size is estimated 120 g on ultrasound  His postvoid residual is moderate on cystoscopy today  Options were discussed with the patient  He is not really interested in a in the procedure  I recommended that he begin combined medical therapy with tamsulosin and finasteride to help improve his voiding pattern and perhaps decrease prostate size and vascularity  History of urinary tract infection  Urinalysis today is unremarkable  Upper tracts were unremarkable by ultrasound  Prostate is large and incomplete emptying likely contributed to his infection  See above  Diagnoses and all orders for this visit:    BPH with obstruction/lower urinary tract symptoms  -     POCT urine dip auto non-scope  -     tamsulosin (FLOMAX) 0 4 mg; Take 1 capsule (0 4 mg total) by mouth daily with dinner  -     finasteride (PROSCAR) 5 mg tablet; Take 1 tablet (5 mg total) by mouth daily  -     Cystoscopy    History of urinary tract infection    Other orders  -     fexofenadine (ALLEGRA) 180 MG tablet; Take 180 mg by mouth 2 (two) times a day          Subjective:      Patient ID: Cecilio Heller is a 67 y o  male  Chief complaint:  Lower urinary tract symptoms secondary to BPH, history of UTI    HPI:  76-year-old male followed for the above complaints  His last office visit he was not happy with his voiding pattern  He was found to have pyuria and a urine culture did grow Klebsiella  He was treated with antibiotic  He reports that since being on the antibiotic he is voiding a little bit better  At this point he is satisfied with his voiding pattern  He denies gross hematuria or dysuria at this time  He returns today for cystoscopy in follow-up        The following portions of the patient's history were reviewed and updated as appropriate: allergies, current medications, past family history, past medical history, past social history, past surgical history and problem list     Review of Systems   Constitutional: Negative for chills, diaphoresis, fatigue and fever  HENT: Negative  Eyes: Negative  Respiratory: Negative  Cardiovascular: Negative  Endocrine: Negative  Genitourinary:        See HPI   Musculoskeletal: Negative  Skin: Negative  Allergic/Immunologic: Negative  Neurological: Negative  Hematological: Negative  Psychiatric/Behavioral: Negative  Objective:      /72   Ht 5' 10" (1 778 m)   Wt 91 6 kg (202 lb)   BMI 28 98 kg/m²          Physical Exam  Vitals reviewed  Constitutional:       General: He is not in acute distress  Appearance: Normal appearance  He is well-developed and normal weight  He is not ill-appearing, toxic-appearing or diaphoretic  HENT:      Head: Normocephalic and atraumatic  Eyes:      General: No scleral icterus  Conjunctiva/sclera: Conjunctivae normal    Cardiovascular:      Rate and Rhythm: Normal rate  Pulmonary:      Effort: Pulmonary effort is normal    Abdominal:      General: There is no distension  Palpations: There is no mass  Tenderness: There is no abdominal tenderness  There is no right CVA tenderness, left CVA tenderness, guarding or rebound  Hernia: No hernia is present  Comments: No hernia   Genitourinary:     Penis: Normal        Testes: Normal    Musculoskeletal:      Cervical back: Neck supple  Skin:     General: Skin is warm and dry  Neurological:      General: No focal deficit present  Mental Status: He is alert and oriented to person, place, and time  Psychiatric:         Mood and Affect: Mood normal          Behavior: Behavior normal          Thought Content:  Thought content normal          Judgment: Judgment normal             Cystoscopy     Date/Time 8/8/2022 10:50 AM     Performed by  Shayne Villalobos Rosalino Sanchez MD     Authorized by Marnie Prince MD      Universal Protocol:  Consent: Verbal consent obtained  Written consent obtained  Risks and benefits: risks, benefits and alternatives were discussed  Consent given by: patient  Patient understanding: patient states understanding of the procedure being performed  Patient consent: the patient's understanding of the procedure matches consent given  Procedure consent: procedure consent matches procedure scheduled  Patient identity confirmed: verbally with patient        Procedure Details:  Procedure type: cystoscopy    Patient tolerance: Patient tolerated the procedure well with no immediate complications    Additional Procedure Details:      Patient presents for cystoscopy  I have discussed the reasons for doing the test, and the potential risks and complications  Patient expressed understanding, and signed informed consent document  The patient was carefully  positioned supine on the examining table  Sterile preparation was performed on the urethra  Xylocaine jelly was instilled and left  Indwelling for the procedure  The [employer] [place] flexible cystoscope was passed with the following findings:      Urethra:  Normal urethra    Prostate:  lateral lobes -bilobar obstruction approximately 80 g                  Bladder:  Mild trabeculation, no lesions, tumor, or stones  Residual urine: Moderate    Patient tolerated the procedure well and was escorted from the examining table

## 2022-10-11 PROBLEM — Z87.440 HISTORY OF URINARY TRACT INFECTION: Status: RESOLVED | Noted: 2022-08-08 | Resolved: 2022-10-11

## 2023-02-20 NOTE — ASSESSMENT & PLAN NOTE
AUA symptom score is 26  He is not happy with his voiding pattern  PSA is normal at 0 55  ( February 24, 2020)  He became tired and noted decreased blood pressure when he tried to increase the terazosin  We discussed treatment options including urolift, GreenLight laser and transurethral resection of the prostate  The pros and cons of each were discussed  Risks were discussed  At the conclusion were discussion he wished to proceed with urolift placement  The procedure was then described in detail  Risks were discussed and consent form signed  room air

## 2023-04-07 ENCOUNTER — OFFICE VISIT (OUTPATIENT)
Dept: UROLOGY | Facility: MEDICAL CENTER | Age: 73
End: 2023-04-07

## 2023-04-07 VITALS
WEIGHT: 216 LBS | BODY MASS INDEX: 30.92 KG/M2 | DIASTOLIC BLOOD PRESSURE: 70 MMHG | SYSTOLIC BLOOD PRESSURE: 122 MMHG | OXYGEN SATURATION: 99 % | HEART RATE: 62 BPM | HEIGHT: 70 IN

## 2023-04-07 DIAGNOSIS — N13.8 BPH WITH OBSTRUCTION/LOWER URINARY TRACT SYMPTOMS: Primary | ICD-10-CM

## 2023-04-07 DIAGNOSIS — N40.1 BPH WITH OBSTRUCTION/LOWER URINARY TRACT SYMPTOMS: Primary | ICD-10-CM

## 2023-04-07 RX ORDER — ROSUVASTATIN CALCIUM 10 MG/1
10 TABLET, COATED ORAL DAILY
COMMUNITY
Start: 2022-11-01 | End: 2023-11-01

## 2023-04-07 RX ORDER — PIMECROLIMUS 1 %
CREAM (GRAM) TOPICAL
COMMUNITY
Start: 2023-04-04

## 2023-04-07 NOTE — ASSESSMENT & PLAN NOTE
He has been on combined medical therapy for 6 months  AUA symptom score is now 16  His symptoms may be worse from steroid use especially nocturia  I recommended that we continue to follow him on both medications (tamsulosin and finasteride)  PSA was 2 38 in June 2022  We will recheck a PSA in June 2023  He will return in 3 to 4 months for follow-up  At that point we will decide whether to continue combined medical therapy or consider a procedure

## 2023-04-07 NOTE — PROGRESS NOTES
Assessment/Plan:    BPH with obstruction/lower urinary tract symptoms  He has been on combined medical therapy for 6 months  AUA symptom score is now 16  His symptoms may be worse from steroid use especially nocturia  I recommended that we continue to follow him on both medications (tamsulosin and finasteride)  PSA was 2 38 in June 2022  We will recheck a PSA in June 2023  He will return in 3 to 4 months for follow-up  At that point we will decide whether to continue combined medical therapy or consider a procedure  Diagnoses and all orders for this visit:    BPH with obstruction/lower urinary tract symptoms  -     PSA Total, Diagnostic; Future  -     Urinalysis with microscopic; Future    Other orders  -     Cancel: Cystoscopy  -     rosuvastatin (CRESTOR) 10 MG tablet; Take 10 mg by mouth daily  -     Elidel 1 % cream          Subjective:      Patient ID: Matthew Sommers is a 67 y o  male  Benign Prostatic Hypertrophy  This is a chronic problem  The current episode started more than 1 year ago  The problem has been gradually improving since onset  Irritative symptoms include frequency and nocturia  Irritative symptoms do not include urgency  Obstructive symptoms do not include dribbling, incomplete emptying, an intermittent stream, a slower stream, straining or a weak stream  Pertinent negatives include no chills, dysuria, genital pain, hematuria or hesitancy  AUA score is 8-19 (16)  His sexual activity is non-contributory to the current illness  The symptoms are aggravated by caffeine  Past treatments include tamsulosin and finasteride  The treatment provided mild relief  He was placed on finasteride about 6 months ago  He has recently been on steroids for psoriasis which may be effecting his urination       The following portions of the patient's history were reviewed and updated as appropriate: allergies, current medications, past family history, past medical history, past social history, "past surgical history and problem list     Review of Systems   Constitutional: Negative for chills, diaphoresis, fatigue and fever  HENT: Negative  Eyes: Negative  Respiratory: Negative  Cardiovascular: Negative  Endocrine: Negative  Genitourinary: Positive for frequency and nocturia  Negative for dysuria, hematuria, hesitancy, incomplete emptying and urgency  See HPI   Musculoskeletal: Negative  Skin: Negative  Psoriasis-improving   Allergic/Immunologic: Negative  Neurological: Negative  Hematological: Negative  Psychiatric/Behavioral: Negative  IPSS Questionnaire (AUA-7): Over the past month…    1)  How often have you had a sensation of not emptying your bladder completely after you finish urinating? 1 - Less than 1 time in 5   2)  How often have you had to urinate again less than two hours after you finished urinating? 5 - Almost always   3)  How often have you found you stopped and started again several times when you urinated? 1 - Less than 1 time in 5   4) How difficult have you found it to postpone urination? 3 - About half the time   5) How often have you had a weak urinary stream?  2 - Less than half the time   6) How often have you had to push or strain to begin urination? 0 - Not at all   7) How many times did you most typically get up to urinate from the time you went to bed until the time you got up in the morning? 4 - 4 times   Total score:  0-7 mildly symptomatic    8-19 moderately symptomatic    20-35 severely symptomatic   QOL-4    Objective:      /70 (BP Location: Right arm, Patient Position: Sitting)   Pulse 62   Ht 5' 10\" (1 778 m)   Wt 98 kg (216 lb)   SpO2 99%   BMI 30 99 kg/m²          Physical Exam  Vitals reviewed  Constitutional:       General: He is not in acute distress  Appearance: Normal appearance  He is well-developed and normal weight  He is not ill-appearing, toxic-appearing or diaphoretic     HENT:      Head: " Normocephalic and atraumatic  Eyes:      General: No scleral icterus  Conjunctiva/sclera: Conjunctivae normal    Cardiovascular:      Rate and Rhythm: Normal rate  Pulmonary:      Effort: Pulmonary effort is normal    Abdominal:      General: There is no distension  Palpations: There is no mass  Tenderness: There is no abdominal tenderness  There is no right CVA tenderness, left CVA tenderness, guarding or rebound  Hernia: No hernia is present  Comments: No hernia   Musculoskeletal:      Cervical back: Neck supple  Skin:     General: Skin is warm and dry  Comments: Psoriasis-left elbow   Neurological:      General: No focal deficit present  Mental Status: He is alert and oriented to person, place, and time  Psychiatric:         Mood and Affect: Mood normal          Behavior: Behavior normal          Thought Content:  Thought content normal          Judgment: Judgment normal

## 2023-07-13 DIAGNOSIS — N13.8 BPH WITH OBSTRUCTION/LOWER URINARY TRACT SYMPTOMS: ICD-10-CM

## 2023-07-13 DIAGNOSIS — N40.1 BPH WITH OBSTRUCTION/LOWER URINARY TRACT SYMPTOMS: ICD-10-CM

## 2023-07-13 RX ORDER — FINASTERIDE 5 MG/1
TABLET, FILM COATED ORAL
Qty: 90 TABLET | Refills: 3 | Status: SHIPPED | OUTPATIENT
Start: 2023-07-13

## 2023-07-14 ENCOUNTER — TELEPHONE (OUTPATIENT)
Dept: UROLOGY | Facility: MEDICAL CENTER | Age: 73
End: 2023-07-14

## 2023-07-14 NOTE — TELEPHONE ENCOUNTER
Called pt and left msg on   VM per comm consent:    Steff Major PA-C  Physician Assistant  Specialty:  Urology  Telephone Encounter      Signed  Encounter Date:  7/14/2023     Signed          May just be symptoms related to BPH.  If worsening LUTS could r/o UTI with urine culture

## 2023-07-17 DIAGNOSIS — N40.1 BPH WITH OBSTRUCTION/LOWER URINARY TRACT SYMPTOMS: ICD-10-CM

## 2023-07-17 DIAGNOSIS — N13.8 BPH WITH OBSTRUCTION/LOWER URINARY TRACT SYMPTOMS: ICD-10-CM

## 2023-07-17 RX ORDER — TAMSULOSIN HYDROCHLORIDE 0.4 MG/1
CAPSULE ORAL
Qty: 90 CAPSULE | Refills: 3 | Status: SHIPPED | OUTPATIENT
Start: 2023-07-17

## 2023-08-08 ENCOUNTER — OFFICE VISIT (OUTPATIENT)
Dept: UROLOGY | Facility: MEDICAL CENTER | Age: 73
End: 2023-08-08
Payer: MEDICARE

## 2023-08-08 VITALS
WEIGHT: 212 LBS | BODY MASS INDEX: 30.35 KG/M2 | OXYGEN SATURATION: 96 % | HEIGHT: 70 IN | DIASTOLIC BLOOD PRESSURE: 80 MMHG | HEART RATE: 57 BPM | SYSTOLIC BLOOD PRESSURE: 110 MMHG

## 2023-08-08 DIAGNOSIS — N13.8 BPH WITH OBSTRUCTION/LOWER URINARY TRACT SYMPTOMS: Primary | ICD-10-CM

## 2023-08-08 DIAGNOSIS — N40.1 BPH WITH OBSTRUCTION/LOWER URINARY TRACT SYMPTOMS: Primary | ICD-10-CM

## 2023-08-08 DIAGNOSIS — Z87.440 HISTORY OF URINARY TRACT INFECTION: ICD-10-CM

## 2023-08-08 LAB — POST-VOID RESIDUAL VOLUME, ML POC: 46 ML

## 2023-08-08 PROCEDURE — 99214 OFFICE O/P EST MOD 30 MIN: CPT | Performed by: UROLOGY

## 2023-08-08 PROCEDURE — 51798 US URINE CAPACITY MEASURE: CPT | Performed by: UROLOGY

## 2023-08-08 RX ORDER — MULTIVIT-MIN/IRON/FOLIC ACID/K 18-600-40
CAPSULE ORAL
COMMUNITY

## 2023-08-08 NOTE — PROGRESS NOTES
Assessment/Plan:    BPH with obstruction/lower urinary tract symptoms  AUA symptom score is presently 28 on combined therapy. He did not improve with cessation of steroids or with the addition of finasteride. Options were discussed. Specifically we discussed transurethral resection of the prostate versus photo selective vaporization of the prostate with the greenlight laser. He is on chronic anticoagulation and I do feel there would be less risk of bleeding with the laser. Procedure was described. Risks were discussed and consent form signed. We will check with cardiology regarding cessation of anticoagulation around the time of the procedure. Urine culture will be checked prior to the procedure. Diagnoses and all orders for this visit:    BPH with obstruction/lower urinary tract symptoms  -     POCT Measure PVR  -     Case request operating room: TRANSURETHRAL RESECTION OF PROSTATE W/ LASER; Standing  -     EKG 12 lead; Future  -     Urine culture; Future  -     Case request operating room: TRANSURETHRAL RESECTION OF PROSTATE W/ LASER    History of urinary tract infection  -     POCT Measure PVR  -     Urine culture; Future    Other orders  -     Cholecalciferol (Vitamin D) 50 MCG (2000 UT) CAPS; Take by mouth  -     Place sequential compression device; Standing          Subjective:      Patient ID: Sapphire Renee is a 75 y.o. male. Benign Prostatic Hypertrophy  This is a chronic problem. The current episode started more than 1 year ago. The problem has been gradually worsening since onset. Irritative symptoms include frequency and nocturia. Irritative symptoms do not include urgency. Obstructive symptoms do not include dribbling, incomplete emptying, an intermittent stream, a slower stream, straining or a weak stream. Pertinent negatives include no chills, dysuria, genital pain, hematuria or hesitancy. AUA score is 8-19 (16). His sexual activity is non-contributory to the current illness. The symptoms are aggravated by caffeine. Past treatments include tamsulosin and finasteride (he underwent urolift in 2020). The treatment provided mild relief. He was placed on finasteride about 12  months ago. He is no longer on steroids. The following portions of the patient's history were reviewed and updated as appropriate: allergies, current medications, past family history, past medical history, past social history, past surgical history and problem list.    Review of Systems   Constitutional: Negative for chills, diaphoresis, fatigue and fever. HENT: Negative. Eyes: Negative. Respiratory: Negative. Cardiovascular: Negative. Endocrine: Negative. Genitourinary: Positive for frequency and nocturia. Negative for dysuria, hematuria, hesitancy, incomplete emptying and urgency. See HPI   Musculoskeletal: Negative. Skin: Negative. Psoriasis-improving   Allergic/Immunologic: Negative. Neurological: Negative. Hematological: Negative. Psychiatric/Behavioral: Negative. AUA SYMPTOM SCORE    Flowsheet Row Most Recent Value   AUA SYMPTOM SCORE    How often have you had a sensation of not emptying your bladder completely after you finished urinating? 4   How often have you had to urinate again less than two hours after you finished urinating? 5   How often have you found you stopped and started again several times when you urinate? 4   How often have you found it difficult to postpone urination? 5   How often have you had a weak urinary stream? 4   How often have you had to push or strain to begin urination? 1   How many times did you most typically get up to urinate from the time you went to bed at night until the time you got up in the morning?  5   Quality of Life: If you were to spend the rest of your life with your urinary condition just the way it is now, how would you feel about that? 6   AUA SYMPTOM SCORE 28          Objective:      /80 (BP Location: Left arm, Patient Position: Sitting, Cuff Size: Large)   Pulse 57   Ht 5' 10" (1.778 m)   Wt 96.2 kg (212 lb)   SpO2 96%   BMI 30.42 kg/m²          Physical Exam  Vitals reviewed. Constitutional:       General: He is not in acute distress. Appearance: Normal appearance. He is well-developed and normal weight. He is not ill-appearing, toxic-appearing or diaphoretic. HENT:      Head: Normocephalic and atraumatic. Eyes:      General: No scleral icterus. Conjunctiva/sclera: Conjunctivae normal.   Cardiovascular:      Rate and Rhythm: Normal rate. Pulmonary:      Effort: Pulmonary effort is normal.   Abdominal:      General: There is no distension. Palpations: Abdomen is soft. There is no mass. Tenderness: There is no abdominal tenderness. There is no right CVA tenderness, left CVA tenderness, guarding or rebound. Hernia: No hernia is present. Comments: No hernia   Genitourinary:     Penis: Normal.       Testes: Normal.      Comments: Prostate moderately enlarged and palpably benign. Musculoskeletal:      Cervical back: Neck supple. Skin:     General: Skin is warm and dry. Comments: Psoriasis-left elbow   Neurological:      General: No focal deficit present. Mental Status: He is alert and oriented to person, place, and time. Psychiatric:         Mood and Affect: Mood normal.         Behavior: Behavior normal.         Thought Content:  Thought content normal.         Judgment: Judgment normal.

## 2023-08-08 NOTE — H&P
Assessment/Plan:    BPH with obstruction/lower urinary tract symptoms  AUA symptom score is presently 28 on combined therapy. He did not improve with cessation of steroids or with the addition of finasteride. Options were discussed. Specifically we discussed transurethral resection of the prostate versus photo selective vaporization of the prostate with the greenlight laser. He is on chronic anticoagulation and I do feel there would be less risk of bleeding with the laser. Procedure was described. Risks were discussed and consent form signed. We will check with cardiology regarding cessation of anticoagulation around the time of the procedure. Urine culture will be checked prior to the procedure. Diagnoses and all orders for this visit:    BPH with obstruction/lower urinary tract symptoms  -     POCT Measure PVR  -     Case request operating room: TRANSURETHRAL RESECTION OF PROSTATE W/ LASER; Standing  -     EKG 12 lead; Future  -     Urine culture; Future  -     Case request operating room: TRANSURETHRAL RESECTION OF PROSTATE W/ LASER    History of urinary tract infection  -     POCT Measure PVR  -     Urine culture; Future    Other orders  -     Cholecalciferol (Vitamin D) 50 MCG (2000 UT) CAPS; Take by mouth  -     Place sequential compression device; Standing         Subjective:     Patient ID: Ирина Victoria is a 75 y.o. male. Benign Prostatic Hypertrophy  This is a chronic problem. The current episode started more than 1 year ago. The problem has been gradually worsening since onset. Irritative symptoms include frequency and nocturia. Irritative symptoms do not include urgency. Obstructive symptoms do not include dribbling, incomplete emptying, an intermittent stream, a slower stream, straining or a weak stream. Pertinent negatives include no chills, dysuria, genital pain, hematuria or hesitancy. AUA score is 8-19 (16). His sexual activity is non-contributory to the current illness. The symptoms are aggravated by caffeine. Past treatments include tamsulosin and finasteride (he underwent urolift in 2020). The treatment provided mild relief. He was placed on finasteride about 12  months ago. He is no longer on steroids. The following portions of the patient's history were reviewed and updated as appropriate: allergies, current medications, past family history, past medical history, past social history, past surgical history and problem list.    Review of Systems   Constitutional: Negative for chills, diaphoresis, fatigue and fever. HENT: Negative. Eyes: Negative. Respiratory: Negative. Cardiovascular: Negative. Endocrine: Negative. Genitourinary: Positive for frequency and nocturia. Negative for dysuria, hematuria, hesitancy, incomplete emptying and urgency. See HPI   Musculoskeletal: Negative. Skin: Negative. Psoriasis-improving   Allergic/Immunologic: Negative. Neurological: Negative. Hematological: Negative. Psychiatric/Behavioral: Negative. AUA SYMPTOM SCORE    Flowsheet Row Most Recent Value   AUA SYMPTOM SCORE    How often have you had a sensation of not emptying your bladder completely after you finished urinating? 4   How often have you had to urinate again less than two hours after you finished urinating? 5   How often have you found you stopped and started again several times when you urinate? 4   How often have you found it difficult to postpone urination? 5   How often have you had a weak urinary stream? 4   How often have you had to push or strain to begin urination? 1   How many times did you most typically get up to urinate from the time you went to bed at night until the time you got up in the morning?  5   Quality of Life: If you were to spend the rest of your life with your urinary condition just the way it is now, how would you feel about that? 6   AUA SYMPTOM SCORE 28          Objective:      /80 (BP Location: Left arm, Patient Position: Sitting, Cuff Size: Large)   Pulse 57   Ht 5' 10" (1.778 m)   Wt 96.2 kg (212 lb)   SpO2 96%   BMI 30.42 kg/m²         Physical Exam  Vitals reviewed. Constitutional:       General: He is not in acute distress. Appearance: Normal appearance. He is well-developed and normal weight. He is not ill-appearing, toxic-appearing or diaphoretic. HENT:      Head: Normocephalic and atraumatic. Eyes:      General: No scleral icterus. Conjunctiva/sclera: Conjunctivae normal.   Cardiovascular:      Rate and Rhythm: Normal rate. Pulmonary:      Effort: Pulmonary effort is normal.   Abdominal:      General: There is no distension. Palpations: Abdomen is soft. There is no mass. Tenderness: There is no abdominal tenderness. There is no right CVA tenderness, left CVA tenderness, guarding or rebound. Hernia: No hernia is present. Comments: No hernia   Genitourinary:     Penis: Normal.       Testes: Normal.      Comments: Prostate moderately enlarged and palpably benign. Musculoskeletal:      Cervical back: Neck supple. Skin:     General: Skin is warm and dry. Comments: Psoriasis-left elbow   Neurological:      General: No focal deficit present. Mental Status: He is alert and oriented to person, place, and time. Psychiatric:         Mood and Affect: Mood normal.         Behavior: Behavior normal.         Thought Content:  Thought content normal.         Judgment: Judgment normal.

## 2023-08-08 NOTE — ASSESSMENT & PLAN NOTE
AUA symptom score is presently 28 on combined therapy. He did not improve with cessation of steroids or with the addition of finasteride. Options were discussed. Specifically we discussed transurethral resection of the prostate versus photo selective vaporization of the prostate with the greenlight laser. He is on chronic anticoagulation and I do feel there would be less risk of bleeding with the laser. Procedure was described. Risks were discussed and consent form signed. We will check with cardiology regarding cessation of anticoagulation around the time of the procedure. Urine culture will be checked prior to the procedure.

## 2023-08-14 ENCOUNTER — PREP FOR PROCEDURE (OUTPATIENT)
Dept: UROLOGY | Facility: MEDICAL CENTER | Age: 73
End: 2023-08-14

## 2023-08-14 DIAGNOSIS — Z01.812 PRE-OPERATIVE LABORATORY EXAMINATION: ICD-10-CM

## 2023-08-14 DIAGNOSIS — N40.1 BPH WITH OBSTRUCTION/LOWER URINARY TRACT SYMPTOMS: Primary | ICD-10-CM

## 2023-08-14 DIAGNOSIS — N13.8 BPH WITH OBSTRUCTION/LOWER URINARY TRACT SYMPTOMS: Primary | ICD-10-CM

## 2023-10-07 PROBLEM — Z87.440 HISTORY OF URINARY TRACT INFECTION: Status: RESOLVED | Noted: 2022-08-08 | Resolved: 2023-10-07

## 2023-10-10 ENCOUNTER — ANESTHESIA EVENT (OUTPATIENT)
Dept: PERIOP | Facility: HOSPITAL | Age: 73
End: 2023-10-10
Payer: MEDICARE

## 2023-10-10 NOTE — PRE-PROCEDURE INSTRUCTIONS
Pre-Surgery Instructions:   Medication Instructions   • apixaban (ELIQUIS) 5 mg Instructions provided by MD   • Cholecalciferol (Vitamin D) 50 MCG (2000 UT) CAPS Stop taking 7 days prior to surgery. • Coenzyme Q10 (COQ-10) 200 MG CAPS Stop taking 7 days prior to surgery. • fexofenadine (ALLEGRA) 180 MG tablet Take day of surgery. • finasteride (PROSCAR) 5 mg tablet Take night before surgery   • latanoprost (XALATAN) 0.005 % ophthalmic solution Take night before surgery   • metoprolol tartrate (LOPRESSOR) 25 mg tablet Take day of surgery. • metoprolol tartrate (LOPRESSOR) 50 mg tablet Take night before surgery   • Multiple Vitamin (MULTIVITAMIN) tablet Stop taking 7 days prior to surgery. • ramipril (ALTACE) 10 MG capsule Hold day of surgery. • rosuvastatin (CRESTOR) 10 MG tablet Take night before surgery   • tamsulosin (FLOMAX) 0.4 mg Take night before surgery   • triamcinolone (KENALOG) 0.1 % ointment Uses PRN- DO NOT take day of surgery    Medication instructions for day surgery reviewed. Please use only a sip of water to take your instructed medications. Avoid all over the counter vitamins, supplements and NSAIDS for one week prior to surgery per anesthesia guidelines. Tylenol is ok to take as needed. You will receive a call one business day prior to surgery with an arrival time and hospital directions. If your surgery is scheduled on a Monday, the hospital will be calling you on the Friday prior to your surgery. If you have not heard from anyone by 8pm, please call the hospital supervisor through the hospital  at [phone]. Lupe Bazan [phone]). Do not eat or drink anything after midnight the night before your surgery, including candy, mints, lifesavers, or chewing gum. Do not drink alcohol 24hrs before your surgery. Try not to smoke at least 24hrs before your surgery.        Follow the pre surgery showering instructions as listed in the [facility] Surgical Experience Booklet” or otherwise provided by your surgeon's office. Do not shave the surgical area 24 hours before surgery. Do not apply any lotions, creams, including makeup, cologne, deodorant, or perfumes after showering on the day of your surgery. No contact lenses, eye make-up, or artificial eyelashes. Remove nail polish, including gel polish, and any artificial, gel, or acrylic nails if possible. Remove all jewelry including rings and body piercing jewelry. Wear causal clothing that is easy to take on and off. Consider your type of surgery. Keep any valuables, jewelry, piercings at home. Please bring any specially ordered equipment (sling, braces) if indicated. Arrange for a responsible person to drive you to and from the hospital on the day of your surgery. Visitor Guidelines discussed. Call the surgeon's office with any new illnesses, exposures, or additional questions prior to surgery. Please reference your [facility] Surgical Experience Booklet” for additional information to prepare for your upcoming surgery.

## 2023-10-16 ENCOUNTER — TELEPHONE (OUTPATIENT)
Dept: UROLOGY | Facility: MEDICAL CENTER | Age: 73
End: 2023-10-16

## 2023-10-16 DIAGNOSIS — N30.00 ACUTE CYSTITIS WITHOUT HEMATURIA: Primary | ICD-10-CM

## 2023-10-16 RX ORDER — SULFAMETHOXAZOLE AND TRIMETHOPRIM 800; 160 MG/1; MG/1
1 TABLET ORAL EVERY 12 HOURS SCHEDULED
Qty: 14 TABLET | Refills: 0 | Status: SHIPPED | OUTPATIENT
Start: 2023-10-16 | End: 2023-10-23

## 2023-10-16 NOTE — TELEPHONE ENCOUNTER
Call placed to patient. He did not answer. LMOM in detail with the MD recommendations and that he should take prescribed medication as directed. Office number was provided for patient to call back with any questions or concerns.

## 2023-10-16 NOTE — TELEPHONE ENCOUNTER
Urine culture is positive. I called in a prescription for Bactrim DS 1 tablet twice daily that he should start today and take until the night before surgery. He will get IV antibiotics on the day of surgery and he can then resume the oral antibiotic the day after the procedure.

## 2023-10-17 NOTE — H&P
Assessment/Plan:    BPH with obstruction/lower urinary tract symptoms  AUA symptom score is presently 28 on combined therapy. He did not improve with cessation of steroids or with the addition of finasteride. Options were discussed. Specifically we discussed transurethral resection of the prostate versus photo selective vaporization of the prostate with the greenlight laser. He is on chronic anticoagulation and I do feel there would be less risk of bleeding with the laser. Procedure was described. Risks were discussed and consent form signed. We will check with cardiology regarding cessation of anticoagulation around the time of the procedure. Urine culture will be checked prior to the procedure. Diagnoses and all orders for this visit:    BPH with obstruction/lower urinary tract symptoms  -     POCT Measure PVR  -     Case request operating room: TRANSURETHRAL RESECTION OF PROSTATE W/ LASER; Standing  -     EKG 12 lead; Future  -     Urine culture; Future  -     Case request operating room: TRANSURETHRAL RESECTION OF PROSTATE W/ LASER    History of urinary tract infection  -     POCT Measure PVR  -     Urine culture; Future    Other orders  -     Cholecalciferol (Vitamin D) 50 MCG (2000 UT) CAPS; Take by mouth  -     Place sequential compression device; Standing         Subjective:     Patient ID: Milady Cardoso is a 75 y.o. male. Benign Prostatic Hypertrophy  This is a chronic problem. The current episode started more than 1 year ago. The problem has been gradually worsening since onset. Irritative symptoms include frequency and nocturia. Irritative symptoms do not include urgency. Obstructive symptoms do not include dribbling, incomplete emptying, an intermittent stream, a slower stream, straining or a weak stream. Pertinent negatives include no chills, dysuria, genital pain, hematuria or hesitancy. AUA score is 8-19 (16). His sexual activity is non-contributory to the current illness. The symptoms are aggravated by caffeine. Past treatments include tamsulosin and finasteride (he underwent urolift in 2020). The treatment provided mild relief. He was placed on finasteride about 12  months ago. He is no longer on steroids. The following portions of the patient's history were reviewed and updated as appropriate: allergies, current medications, past family history, past medical history, past social history, past surgical history and problem list.    Review of Systems   Constitutional: Negative for chills, diaphoresis, fatigue and fever. HENT: Negative. Eyes: Negative. Respiratory: Negative. Cardiovascular: Negative. Endocrine: Negative. Genitourinary: Positive for frequency and nocturia. Negative for dysuria, hematuria, hesitancy, incomplete emptying and urgency. See HPI   Musculoskeletal: Negative. Skin: Negative. Psoriasis-improving   Allergic/Immunologic: Negative. Neurological: Negative. Hematological: Negative. Psychiatric/Behavioral: Negative. AUA SYMPTOM SCORE      Flowsheet Row Most Recent Value   AUA SYMPTOM SCORE    How often have you had a sensation of not emptying your bladder completely after you finished urinating? 4   How often have you had to urinate again less than two hours after you finished urinating? 5   How often have you found you stopped and started again several times when you urinate? 4   How often have you found it difficult to postpone urination? 5   How often have you had a weak urinary stream? 4   How often have you had to push or strain to begin urination? 1   How many times did you most typically get up to urinate from the time you went to bed at night until the time you got up in the morning?  5   Quality of Life: If you were to spend the rest of your life with your urinary condition just the way it is now, how would you feel about that? 6   AUA SYMPTOM SCORE 28            Objective:      /80 (BP Location: Left arm, Patient Position: Sitting, Cuff Size: Large)   Pulse 57   Ht 5' 10" (1.778 m)   Wt 96.2 kg (212 lb)   SpO2 96%   BMI 30.42 kg/m²         Physical Exam  Vitals reviewed. Constitutional:       General: He is not in acute distress. Appearance: Normal appearance. He is well-developed and normal weight. He is not ill-appearing, toxic-appearing or diaphoretic. HENT:      Head: Normocephalic and atraumatic. Eyes:      General: No scleral icterus. Conjunctiva/sclera: Conjunctivae normal.   Cardiovascular:      Rate and Rhythm: Normal rate. Pulmonary:      Effort: Pulmonary effort is normal.   Abdominal:      General: There is no distension. Palpations: Abdomen is soft. There is no mass. Tenderness: There is no abdominal tenderness. There is no right CVA tenderness, left CVA tenderness, guarding or rebound. Hernia: No hernia is present. Comments: No hernia   Genitourinary:     Penis: Normal.       Testes: Normal.      Comments: Prostate moderately enlarged and palpably benign. Musculoskeletal:      Cervical back: Neck supple. Skin:     General: Skin is warm and dry. Comments: Psoriasis-left elbow   Neurological:      General: No focal deficit present. Mental Status: He is alert and oriented to person, place, and time. Psychiatric:         Mood and Affect: Mood normal.         Behavior: Behavior normal.         Thought Content:  Thought content normal.         Judgment: Judgment normal.

## 2023-10-19 ENCOUNTER — TELEPHONE (OUTPATIENT)
Dept: UROLOGY | Facility: MEDICAL CENTER | Age: 73
End: 2023-10-19

## 2023-10-19 ENCOUNTER — HOSPITAL ENCOUNTER (OUTPATIENT)
Facility: HOSPITAL | Age: 73
Setting detail: OUTPATIENT SURGERY
Discharge: HOME | End: 2023-10-19
Attending: UROLOGY | Admitting: UROLOGY
Payer: MEDICARE

## 2023-10-19 ENCOUNTER — ANESTHESIA (OUTPATIENT)
Dept: PERIOP | Facility: HOSPITAL | Age: 73
End: 2023-10-19
Payer: MEDICARE

## 2023-10-19 VITALS
SYSTOLIC BLOOD PRESSURE: 154 MMHG | HEART RATE: 79 BPM | RESPIRATION RATE: 18 BRPM | WEIGHT: 213.63 LBS | DIASTOLIC BLOOD PRESSURE: 75 MMHG | BODY MASS INDEX: 30.58 KG/M2 | TEMPERATURE: 96.3 F | OXYGEN SATURATION: 96 % | HEIGHT: 70 IN

## 2023-10-19 DIAGNOSIS — N13.8 BPH WITH OBSTRUCTION/LOWER URINARY TRACT SYMPTOMS: Primary | ICD-10-CM

## 2023-10-19 DIAGNOSIS — N40.1 BPH WITH OBSTRUCTION/LOWER URINARY TRACT SYMPTOMS: Primary | ICD-10-CM

## 2023-10-19 PROBLEM — Z99.89 CPAP (CONTINUOUS POSITIVE AIRWAY PRESSURE) DEPENDENCE: Status: ACTIVE | Noted: 2023-10-19

## 2023-10-19 PROBLEM — G47.30 SLEEP APNEA: Status: ACTIVE | Noted: 2023-10-19

## 2023-10-19 PROBLEM — I48.91 A-FIB (HCC): Status: RESOLVED | Noted: 2023-10-19 | Resolved: 2023-10-19

## 2023-10-19 PROBLEM — K08.109 TEETH MISSING: Status: ACTIVE | Noted: 2023-10-19

## 2023-10-19 PROBLEM — I48.91 ATRIAL FIBRILLATION (HCC): Status: RESOLVED | Noted: 2023-10-19 | Resolved: 2023-10-19

## 2023-10-19 PROBLEM — I48.91 ATRIAL FIBRILLATION (HCC): Status: ACTIVE | Noted: 2023-10-19

## 2023-10-19 PROCEDURE — 52648 LASER SURGERY OF PROSTATE: CPT | Performed by: UROLOGY

## 2023-10-19 RX ORDER — ONDANSETRON 2 MG/ML
4 INJECTION INTRAMUSCULAR; INTRAVENOUS EVERY 6 HOURS PRN
Status: DISCONTINUED | OUTPATIENT
Start: 2023-10-19 | End: 2023-10-19 | Stop reason: HOSPADM

## 2023-10-19 RX ORDER — ONDANSETRON 2 MG/ML
INJECTION INTRAMUSCULAR; INTRAVENOUS AS NEEDED
Status: DISCONTINUED | OUTPATIENT
Start: 2023-10-19 | End: 2023-10-19

## 2023-10-19 RX ORDER — KETOROLAC TROMETHAMINE 30 MG/ML
15 INJECTION, SOLUTION INTRAMUSCULAR; INTRAVENOUS EVERY 6 HOURS SCHEDULED
Status: DISCONTINUED | OUTPATIENT
Start: 2023-10-19 | End: 2023-10-19 | Stop reason: HOSPADM

## 2023-10-19 RX ORDER — ONDANSETRON 2 MG/ML
4 INJECTION INTRAMUSCULAR; INTRAVENOUS ONCE AS NEEDED
Status: DISCONTINUED | OUTPATIENT
Start: 2023-10-19 | End: 2023-10-19 | Stop reason: HOSPADM

## 2023-10-19 RX ORDER — SODIUM CHLORIDE 9 MG/ML
125 INJECTION, SOLUTION INTRAVENOUS CONTINUOUS
Status: DISCONTINUED | OUTPATIENT
Start: 2023-10-19 | End: 2023-10-19 | Stop reason: HOSPADM

## 2023-10-19 RX ORDER — FUROSEMIDE 10 MG/ML
INJECTION INTRAMUSCULAR; INTRAVENOUS AS NEEDED
Status: DISCONTINUED | OUTPATIENT
Start: 2023-10-19 | End: 2023-10-19

## 2023-10-19 RX ORDER — SODIUM CHLORIDE 9 MG/ML
INJECTION, SOLUTION INTRAVENOUS AS NEEDED
Status: DISCONTINUED | OUTPATIENT
Start: 2023-10-19 | End: 2023-10-19 | Stop reason: HOSPADM

## 2023-10-19 RX ORDER — ACETAMINOPHEN 325 MG/1
975 TABLET ORAL ONCE
Status: DISCONTINUED | OUTPATIENT
Start: 2023-10-19 | End: 2023-10-19 | Stop reason: HOSPADM

## 2023-10-19 RX ORDER — FENTANYL CITRATE 50 UG/ML
INJECTION, SOLUTION INTRAMUSCULAR; INTRAVENOUS AS NEEDED
Status: DISCONTINUED | OUTPATIENT
Start: 2023-10-19 | End: 2023-10-19

## 2023-10-19 RX ORDER — FENTANYL CITRATE/PF 50 MCG/ML
25 SYRINGE (ML) INJECTION
Status: DISCONTINUED | OUTPATIENT
Start: 2023-10-19 | End: 2023-10-19 | Stop reason: HOSPADM

## 2023-10-19 RX ORDER — PHENAZOPYRIDINE HYDROCHLORIDE 200 MG/1
200 TABLET, FILM COATED ORAL ONCE AS NEEDED
Status: DISCONTINUED | OUTPATIENT
Start: 2023-10-19 | End: 2023-10-19 | Stop reason: HOSPADM

## 2023-10-19 RX ORDER — OXYCODONE HYDROCHLORIDE 5 MG/1
5 TABLET ORAL EVERY 4 HOURS PRN
Status: DISCONTINUED | OUTPATIENT
Start: 2023-10-19 | End: 2023-10-19 | Stop reason: HOSPADM

## 2023-10-19 RX ORDER — EPHEDRINE SULFATE 50 MG/ML
INJECTION INTRAVENOUS AS NEEDED
Status: DISCONTINUED | OUTPATIENT
Start: 2023-10-19 | End: 2023-10-19

## 2023-10-19 RX ORDER — DEXAMETHASONE SODIUM PHOSPHATE 10 MG/ML
INJECTION, SOLUTION INTRAMUSCULAR; INTRAVENOUS AS NEEDED
Status: DISCONTINUED | OUTPATIENT
Start: 2023-10-19 | End: 2023-10-19

## 2023-10-19 RX ORDER — CEFAZOLIN SODIUM 2 G/50ML
2000 SOLUTION INTRAVENOUS ONCE
Status: COMPLETED | OUTPATIENT
Start: 2023-10-19 | End: 2023-10-19

## 2023-10-19 RX ORDER — PROPOFOL 10 MG/ML
INJECTION, EMULSION INTRAVENOUS AS NEEDED
Status: DISCONTINUED | OUTPATIENT
Start: 2023-10-19 | End: 2023-10-19

## 2023-10-19 RX ORDER — OXYCODONE HYDROCHLORIDE 5 MG/1
TABLET ORAL
Qty: 8 TABLET | Refills: 0 | Status: SHIPPED | OUTPATIENT
Start: 2023-10-19

## 2023-10-19 RX ORDER — PHENAZOPYRIDINE HYDROCHLORIDE 200 MG/1
TABLET, FILM COATED ORAL
Qty: 10 TABLET | Refills: 0 | Status: SHIPPED | OUTPATIENT
Start: 2023-10-19

## 2023-10-19 RX ADMIN — EPHEDRINE SULFATE 10 MG: 50 INJECTION, SOLUTION INTRAVENOUS at 13:02

## 2023-10-19 RX ADMIN — FENTANYL CITRATE 25 MCG: 0.05 INJECTION, SOLUTION INTRAMUSCULAR; INTRAVENOUS at 12:55

## 2023-10-19 RX ADMIN — SODIUM CHLORIDE: 0.9 INJECTION, SOLUTION INTRAVENOUS at 13:06

## 2023-10-19 RX ADMIN — DEXAMETHASONE SODIUM PHOSPHATE 8 MG: 10 INJECTION INTRAMUSCULAR; INTRAVENOUS at 12:54

## 2023-10-19 RX ADMIN — FENTANYL CITRATE 25 MCG: 0.05 INJECTION, SOLUTION INTRAMUSCULAR; INTRAVENOUS at 12:51

## 2023-10-19 RX ADMIN — FENTANYL CITRATE 25 MCG: 0.05 INJECTION, SOLUTION INTRAMUSCULAR; INTRAVENOUS at 13:09

## 2023-10-19 RX ADMIN — CEFAZOLIN SODIUM 2000 MG: 2 SOLUTION INTRAVENOUS at 12:42

## 2023-10-19 RX ADMIN — EPHEDRINE SULFATE 10 MG: 50 INJECTION, SOLUTION INTRAVENOUS at 13:51

## 2023-10-19 RX ADMIN — FENTANYL CITRATE 25 MCG: 0.05 INJECTION, SOLUTION INTRAMUSCULAR; INTRAVENOUS at 13:02

## 2023-10-19 RX ADMIN — SODIUM CHLORIDE 125 ML/HR: 0.9 INJECTION, SOLUTION INTRAVENOUS at 10:32

## 2023-10-19 RX ADMIN — FUROSEMIDE 10 MG: 10 INJECTION, SOLUTION INTRAVENOUS at 14:21

## 2023-10-19 RX ADMIN — ONDANSETRON 4 MG: 2 INJECTION INTRAMUSCULAR; INTRAVENOUS at 12:54

## 2023-10-19 RX ADMIN — EPHEDRINE SULFATE 10 MG: 50 INJECTION, SOLUTION INTRAVENOUS at 13:14

## 2023-10-19 RX ADMIN — PROPOFOL 150 MG: 10 INJECTION, EMULSION INTRAVENOUS at 12:48

## 2023-10-19 RX ADMIN — EPHEDRINE SULFATE 5 MG: 50 INJECTION, SOLUTION INTRAVENOUS at 12:58

## 2023-10-19 NOTE — ANESTHESIA POSTPROCEDURE EVALUATION
Post-Op Assessment Note    CV Status:  Stable    Pain management: adequate     Mental Status:  Alert and awake   Hydration Status:  Euvolemic   PONV Controlled:  Controlled   Airway Patency:  Patent   Two or more mitigation strategies used for obstructive sleep apnea   Post Op Vitals Reviewed: Yes      Staff: Anesthesiologist         No notable events documented.     /90 (10/19/23 1516)    Temp     Pulse 73 (10/19/23 1516)   Resp 16 (10/19/23 1516)    SpO2 95 % (10/19/23 1516)

## 2023-10-19 NOTE — DISCHARGE INSTR - AVS FIRST PAGE
Rest and drink plenty of fluids. No heavy lifting. Can shower tomorrow. Use Tylenol around-the-clock for pain. For more severe pain you can use the oxycodone prescribed. Pyridium can be used for bladder discomfort and burning. It will turn your urine a deep orange color. Expect some blood in the urine for the next 4 to 6 weeks. It is also normal to occasionally pass scabs and white tissue. The Nunes catheter should continue to drain. Call the office for fever, difficulty with the Nunes catheter draining, severe pain or heavy urinary bleeding.   Resume your anticoagulation as instructed by the cardiologist.

## 2023-10-19 NOTE — TELEPHONE ENCOUNTER
Patient underwent greenlight laser today October 19. Nunes can be removed on Monday the 23rd. He will need a 6-week follow-up visit.

## 2023-10-19 NOTE — ANESTHESIA PREPROCEDURE EVALUATION
Procedure:  TRANSURETHRAL RESECTION OF PROSTATE W/ GREENLIGHT LASER (Urethra)    Relevant Problems   CARDIO   (+) A-fib (HCC) (Resolved)   (+) Atrial fibrillation (HCC) (Resolved)   (+) Hypercholesterolemia   (+) Hypertension      /RENAL   (+) BPH with obstruction/lower urinary tract symptoms      PULMONARY   (+) Sleep apnea      Musculoskeletal and Integument   (+) Psoriasis      Other   (+) CPAP (continuous positive airway pressure) dependence   (+) Glaucoma   (+) Teeth missing        Physical Exam    Airway    Mallampati score: II  TM Distance: >3 FB  Neck ROM: full     Dental   No notable dental hx     Cardiovascular  Rhythm: regular, Rate: normal, Cardiovascular exam normal    Pulmonary  Pulmonary exam normal Breath sounds clear to auscultation    Other Findings        Anesthesia Plan  ASA Score- 3     Anesthesia Type- general with ASA Monitors. Additional Monitors:     Airway Plan: LMA. Comment: OFELIA Ricketts was 10/16/23. Kendrick Lennox Plan Factors-    Chart reviewed. EKG reviewed. Existing labs reviewed. Patient summary reviewed. Patient is not a current smoker. Patient not instructed to abstain from smoking on day of procedure. Patient did not smoke on day of surgery. There is medical exclusion for perioperative obstructive sleep apnea risk education. Induction- intravenous. Postoperative Plan-     Informed Consent- Anesthetic plan and risks discussed with patient and spouse.

## 2023-10-19 NOTE — OP NOTE
OPERATIVE REPORT  PATIENT NAME: Wilfredo Garcia    :  1950  MRN: [id number]  Pt Location: [facility] OR ROOM 03    SURGERY DATE: 10/19/2023    Surgeon(s) and Role:     * Denise Villegas MD - Primary    Preop Diagnosis:  BPH with obstruction/lower urinary tract symptoms [N40.1, N13.8]    Post-Op Diagnosis Codes:     * BPH with obstruction/lower urinary tract symptoms [N40.1, N13.8]    Procedure(s):  TRANSURETHRAL RESECTION OF PROSTATE W/ GREENLIGHT LASER    Specimen(s):  * No specimens in log *    Estimated Blood Loss:   Minimal    Drains:  Urethral Catheter Latex 24 Fr. (Active)   Number of days: 0       Anesthesia Type:   Choice    Operative Indications:  BPH with obstruction/lower urinary tract symptoms [N40.1, N13.8]      Operative Findings:  Normal urethra with predominantly bilobar prostatic hypertrophy causing complete outflow obstruction. Bladder moderately trabeculated. No stones, tumors or diverticula. Uneventful photoselective vaporization of the prostate with a high power greenlight laser. An excellent channel for urination was created. A 24 Citizen of [place] Nunes catheter was left at the conclusion of the procedure with 45 cc in the balloon. Complications:   None    Procedure and Technique:      Procedure and Technique:   The patient was brought to the operating room and identified as Wilfredo Garcia. General anesthesia was administered and the patient was placed in Lithotomy position. The patient was prepped and draped in the usual sterile fashion. Compression boots were employed. IV antibiotics administered and time out performed. Cystoscopy was performed with the 23 Irish continuous flow cystoscope. Findings are as above. Ureteral orifices and veru montanum were identified. The greenlight laser fiber was placed through the scope. Vaporization began on the bladder neck at 80 moran power. The bladder neck was vaporized circumferentially.  Right and left lateral lobes were vaporized to level of capsular fibers. Power was raised to 180 W to ensure adequate vaporization. Anterior and apical tissue were lasered. No vaporization was performed distal to the veru. All 6 UroLift clips were removed with a cystoscopic grasper. When vaporization was complete hemostasis was confirmed and any bleeders cauterized. The ureteral orifices were identified and unaffected by laser energy. The bladder was left full and cystoscope removed. A 24 F english catheter was placed and balloon filled to 45cc. The catheter irrigated well and returned light pink to clear urine. The english was placed to straight drainage. The patient was awakened from anesthesia. He tolerated the procedure well and was taken to the recovery room in satisfactory condition. I was present for the entire procedure.     Patient Disposition:  PACU  and hemodynamically stable        SIGNATURE: Jorge Luis Peterson MD  DATE: October 19, 2023  TIME: 2:28 PM

## 2023-10-19 NOTE — TELEPHONE ENCOUNTER
Pt currently inpatient. Will monitor for discharge and then contact patient to arrange for FU. Pt is tentatively scheduled on 10- with the nurse for english catheter removal at [facility] office. Once discharged. Office will contact patient to confirm.

## 2023-10-19 NOTE — INTERVAL H&P NOTE
H&P reviewed. After examining the patient I find no changes in the patients condition since the H&P had been written. Vitals:    10/19/23 1026   BP: 142/63   Pulse: 57   Resp: 16   Temp: (!) 97.4 °F (36.3 °C)   SpO2: 96%   Procedure and risks reviewed with patient in the holding unit.

## 2023-10-20 NOTE — TELEPHONE ENCOUNTER
Call placed to patient again. He did not answer. LMOM advising patient that we are trying to contact him to see how he is doing and to confirm his appointment for Monday at [facility]. Asked patient to please contact the office to see how if he doing and to confirm appointment for Monday. Number was provided for call back.

## 2023-10-23 ENCOUNTER — PROCEDURE VISIT (OUTPATIENT)
Dept: UROLOGY | Facility: CLINIC | Age: 73
End: 2023-10-23

## 2023-10-23 VITALS
WEIGHT: 211 LBS | BODY MASS INDEX: 30.21 KG/M2 | SYSTOLIC BLOOD PRESSURE: 120 MMHG | DIASTOLIC BLOOD PRESSURE: 70 MMHG | HEIGHT: 70 IN | RESPIRATION RATE: 20 BRPM | HEART RATE: 56 BPM

## 2023-10-23 DIAGNOSIS — N13.8 BPH WITH OBSTRUCTION/LOWER URINARY TRACT SYMPTOMS: Primary | ICD-10-CM

## 2023-10-23 DIAGNOSIS — N40.1 BPH WITH OBSTRUCTION/LOWER URINARY TRACT SYMPTOMS: Primary | ICD-10-CM

## 2023-10-23 PROCEDURE — 99024 POSTOP FOLLOW-UP VISIT: CPT

## 2023-10-23 NOTE — PROGRESS NOTES
10/23/2023    Ailyn Barboza is a 75 y.o. male  [id number]    Diagnosis:  Chief Complaint    Benign Prostatic Hypertrophy         Patient presents for english  removal s/p TURP with green light laser  on 10/19/2023 with Dr. Vidhi Nicole:  Keep follow up appointment on 11/28/2023 with Dr Davis Leonard. Procedure: English removed after deflation of intact balloon without incident. Patient tolerated procedure well.     Vitals:    10/23/23 0810   BP: 120/70   Pulse: 56   Resp: 20   Weight: 95.7 kg (211 lb)   Height: 5' 10" (1.778 m)         Gagan Martinez RN

## 2023-11-28 ENCOUNTER — OFFICE VISIT (OUTPATIENT)
Dept: UROLOGY | Facility: MEDICAL CENTER | Age: 73
End: 2023-11-28
Payer: MEDICARE

## 2023-11-28 VITALS
SYSTOLIC BLOOD PRESSURE: 118 MMHG | HEIGHT: 70 IN | DIASTOLIC BLOOD PRESSURE: 60 MMHG | BODY MASS INDEX: 30.78 KG/M2 | HEART RATE: 60 BPM | OXYGEN SATURATION: 98 % | WEIGHT: 215 LBS

## 2023-11-28 DIAGNOSIS — N13.8 BPH WITH OBSTRUCTION/LOWER URINARY TRACT SYMPTOMS: ICD-10-CM

## 2023-11-28 DIAGNOSIS — Z90.79 S/P TURP (STATUS POST TRANSURETHRAL RESECTION OF PROSTATE): Primary | ICD-10-CM

## 2023-11-28 DIAGNOSIS — N40.1 BPH WITH OBSTRUCTION/LOWER URINARY TRACT SYMPTOMS: ICD-10-CM

## 2023-11-28 LAB — POST-VOID RESIDUAL VOLUME, ML POC: 24 ML

## 2023-11-28 PROCEDURE — 51798 US URINE CAPACITY MEASURE: CPT | Performed by: UROLOGY

## 2023-11-28 PROCEDURE — 99024 POSTOP FOLLOW-UP VISIT: CPT | Performed by: UROLOGY

## 2023-11-28 NOTE — ASSESSMENT & PLAN NOTE
I reassured the patient that the urgency and frequency should improve. We will check a urine culture to ensure there is no infection. His postvoid residual is excellent. He will return in approximately 2 months.

## 2023-11-28 NOTE — PROGRESS NOTES
Assessment/Plan:    S/P TURP (status post transurethral resection of prostate)  I reassured the patient that the urgency and frequency should improve. We will check a urine culture to ensure there is no infection. His postvoid residual is excellent. He will return in approximately 2 months. Diagnoses and all orders for this visit:    S/P TURP (status post transurethral resection of prostate)  -     POCT Measure PVR  -     Urine culture    BPH with obstruction/lower urinary tract symptoms  -     POCT Measure PVR  -     Urine culture          Subjective:      Patient ID: Wilfredo Garcia is a 75 y.o. male. Post op GLL    [id number] male who is now 6 weeks post photoselective vaporization the prostate with the greenlight laser. He reports that he continues to have urinary urgency and frequency. His stream is excellent. He is emptying his bladder well. No dysuria. No gross hematuria. He is back on his Eliquis. He otherwise is feeling well. The following portions of the patient's history were reviewed and updated as appropriate: allergies, current medications, past family history, past medical history, past social history, past surgical history, and problem list.    Review of Systems   Constitutional:  Negative for chills, diaphoresis, fatigue and fever. HENT: Negative. Eyes: Negative. Respiratory: Negative. Cardiovascular: Negative. Gastrointestinal: Negative. Endocrine: Negative. Genitourinary:         See HPI   Musculoskeletal: Negative. Skin: Negative. Allergic/Immunologic: Negative. Neurological: Negative. Hematological: Negative. Psychiatric/Behavioral: Negative. AUA SYMPTOM SCORE      Flowsheet Row Most Recent Value   AUA SYMPTOM SCORE    How often have you had a sensation of not emptying your bladder completely after you finished urinating? 3 (P)     How often have you had to urinate again less than two hours after you finished urinating? 4 (P)     How often have you found you stopped and started again several times when you urinate? 2 (P)     How often have you found it difficult to postpone urination? 5 (P)     How often have you had a weak urinary stream? 1 (P)     How often have you had to push or strain to begin urination? 0 (P)     How many times did you most typically get up to urinate from the time you went to bed at night until the time you got up in the morning? 3(P)     Quality of Life: If you were to spend the rest of your life with your urinary condition just the way it is now, how would you feel about that? 5 (P)     AUA SYMPTOM SCORE 18 (P)            Objective:      /60 (BP Location: Left arm, Patient Position: Sitting, Cuff Size: Large)   Pulse 60   Ht 5' 10" (1.778 m)   Wt 97.5 kg (215 lb)   SpO2 98%   BMI 30.85 kg/m²          Physical Exam  Constitutional:       General: He is not in acute distress. Appearance: Normal appearance. He is well-developed and normal weight. He is not ill-appearing, toxic-appearing or diaphoretic. HENT:      Head: Normocephalic and atraumatic. Eyes:      General: No scleral icterus. Conjunctiva/sclera: Conjunctivae normal.   Cardiovascular:      Rate and Rhythm: Normal rate. Pulmonary:      Effort: Pulmonary effort is normal.   Abdominal:      General: Abdomen is flat. There is no distension. Palpations: There is no mass. Tenderness: There is no abdominal tenderness. There is no right CVA tenderness, left CVA tenderness, guarding or rebound. Hernia: No hernia is present. Genitourinary:     Penis: Normal.       Testes: Normal.   Musculoskeletal:      Cervical back: Neck supple. Skin:     General: Skin is warm and dry. Neurological:      General: No focal deficit present. Mental Status: He is alert and oriented to person, place, and time.    Psychiatric:         Mood and Affect: Mood normal.         Behavior: Behavior normal.         Thought Content: Thought content normal.         Judgment: Judgment normal.

## 2023-11-29 ENCOUNTER — APPOINTMENT (OUTPATIENT)
Dept: LAB | Facility: CLINIC | Age: 73
End: 2023-11-29
Payer: MEDICARE

## 2023-11-29 DIAGNOSIS — Z90.79 S/P TURP (STATUS POST TRANSURETHRAL RESECTION OF PROSTATE): ICD-10-CM

## 2023-11-29 DIAGNOSIS — N13.8 BPH WITH OBSTRUCTION/LOWER URINARY TRACT SYMPTOMS: ICD-10-CM

## 2023-11-29 DIAGNOSIS — N40.1 BPH WITH OBSTRUCTION/LOWER URINARY TRACT SYMPTOMS: ICD-10-CM

## 2023-11-29 PROCEDURE — 87086 URINE CULTURE/COLONY COUNT: CPT

## 2023-11-30 LAB — BACTERIA UR CULT: NORMAL

## 2023-12-12 ENCOUNTER — TELEPHONE (OUTPATIENT)
Age: 73
End: 2023-12-12

## 2023-12-12 NOTE — TELEPHONE ENCOUNTER
Pt calling to reschedule February nancy.  Stated it is a follow up surgery.  Nothing available until July. Please advise when pt can be reschedule too pt can be reached [phone]

## 2024-02-29 RX ORDER — RUXOLITINIB 15 MG/G
1 CREAM TOPICAL DAILY
COMMUNITY
Start: 2024-02-27

## 2024-02-29 RX ORDER — TIOTROPIUM BROMIDE INHALATION SPRAY 3.12 UG/1
2 SPRAY, METERED RESPIRATORY (INHALATION) DAILY
COMMUNITY
Start: 2023-12-01

## 2024-03-01 ENCOUNTER — OFFICE VISIT (OUTPATIENT)
Dept: UROLOGY | Facility: MEDICAL CENTER | Age: 74
End: 2024-03-01
Payer: MEDICARE

## 2024-03-01 VITALS
SYSTOLIC BLOOD PRESSURE: 110 MMHG | BODY MASS INDEX: 30.92 KG/M2 | DIASTOLIC BLOOD PRESSURE: 78 MMHG | HEIGHT: 70 IN | OXYGEN SATURATION: 97 % | HEART RATE: 50 BPM | WEIGHT: 216 LBS

## 2024-03-01 DIAGNOSIS — N13.8 BPH WITH OBSTRUCTION/LOWER URINARY TRACT SYMPTOMS: Primary | ICD-10-CM

## 2024-03-01 DIAGNOSIS — N40.1 BPH WITH OBSTRUCTION/LOWER URINARY TRACT SYMPTOMS: Primary | ICD-10-CM

## 2024-03-01 DIAGNOSIS — Z90.79 S/P TURP (STATUS POST TRANSURETHRAL RESECTION OF PROSTATE): ICD-10-CM

## 2024-03-01 PROCEDURE — 99213 OFFICE O/P EST LOW 20 MIN: CPT | Performed by: UROLOGY

## 2024-03-01 NOTE — PROGRESS NOTES
Assessment/Plan:    BPH with obstruction/lower urinary tract symptoms  He is doing very well approximately 4 months post photo selective vaporization of the prostate with the greenlight laser.  Irritative voiding symptoms have resolved.  AUA symptom score is now 4 and he is pleased with his voiding pattern.  PSA in July 2023 was 0.59.  I recommended that  the patient continue tamsulosin and finasteride.  We will recheck a PSA in July 2024.  He will return in 1 year for follow-up.         Diagnoses and all orders for this visit:    BPH with obstruction/lower urinary tract symptoms  -     PSA Total, Diagnostic; Future    S/P TURP (status post transurethral resection of prostate)  -     PSA Total, Diagnostic; Future            Subjective:      Patient ID: Serge Tinoco is a 73 y.o. male.    Post op GLL    IOT-[id number]-khlf-old male who is now more than 4 months post photo selective vaporization of the prostate with the greenlight laser.  He no longer has frequency and urgency.  His stream is excellent.  He is emptying his bladder well.  Nocturia x 1.  No dysuria.  No gross hematuria.  Control is good.  He is back on his Eliquis.  He otherwise is feeling well.  He is pleased with the situation.        The following portions of the patient's history were reviewed and updated as appropriate: allergies, current medications, past family history, past medical history, past social history, past surgical history, and problem list.    Review of Systems   Constitutional:  Negative for diaphoresis, fatigue and fever.   HENT: Negative.     Eyes: Negative.    Respiratory: Negative.     Cardiovascular: Negative.    Gastrointestinal: Negative.    Endocrine: Negative.    Genitourinary:         See HPI   Musculoskeletal: Negative.    Skin: Negative.    Allergic/Immunologic: Negative.    Neurological: Negative.    Hematological: Negative.    Psychiatric/Behavioral: Negative.         AUA SYMPTOM SCORE      Flowsheet Row Most Recent Value  "  AUA SYMPTOM SCORE    How often have you had a sensation of not emptying your bladder completely after you finished urinating? 1 (P)    How often have you had to urinate again less than two hours after you finished urinating? 1 (P)    How often have you found you stopped and started again several times when you urinate? 0 (P)    How often have you found it difficult to postpone urination? 1 (P)    How often have you had a weak urinary stream? 0 (P)    How often have you had to push or strain to begin urination? 0 (P)    How many times did you most typically get up to urinate from the time you went to bed at night until the time you got up in the morning? 1 (P)    Quality of Life: If you were to spend the rest of your life with your urinary condition just the way it is now, how would you feel about that? 1 (P)    AUA SYMPTOM SCORE 4 (P)               Objective:      /78 (BP Location: Left arm, Patient Position: Sitting, Cuff Size: Standard)   Pulse (!) 50   Ht 5' 10\" (1.778 m)   Wt 98 kg (216 lb)   SpO2 97%   BMI 30.99 kg/m²          Physical Exam  Constitutional:       General: He is not in acute distress.     Appearance: Normal appearance. He is well-developed and normal weight. He is not ill-appearing, toxic-appearing or diaphoretic.   HENT:      Head: Normocephalic and atraumatic.   Eyes:      General: No scleral icterus.     Conjunctiva/sclera: Conjunctivae normal.   Cardiovascular:      Rate and Rhythm: Normal rate.   Pulmonary:      Effort: Pulmonary effort is normal.   Abdominal:      General: Abdomen is flat. There is no distension.      Palpations: There is no mass.      Tenderness: There is no abdominal tenderness. There is no right CVA tenderness, left CVA tenderness, guarding or rebound.      Hernia: No hernia is present.   Genitourinary:     Penis: Normal.       Testes: Normal.   Musculoskeletal:      Cervical back: Neck supple.   Skin:     General: Skin is warm and dry.   Neurological:      " General: No focal deficit present.      Mental Status: He is alert and oriented to person, place, and time.   Psychiatric:         Mood and Affect: Mood normal.         Behavior: Behavior normal.         Thought Content: Thought content normal.         Judgment: Judgment normal.

## 2024-03-01 NOTE — ASSESSMENT & PLAN NOTE
He is doing very well approximately 4 months post photo selective vaporization of the prostate with the greenlight laser.  Irritative voiding symptoms have resolved.  AUA symptom score is now 4 and he is pleased with his voiding pattern.  PSA in July 2023 was 0.59.  I recommended that  the patient continue tamsulosin and finasteride.  We will recheck a PSA in July 2024.  He will return in 1 year for follow-up.

## 2024-07-22 LAB — PSA SERPL-MCNC: 1.31 NG/ML

## 2025-02-27 ENCOUNTER — TELEPHONE (OUTPATIENT)
Age: 75
End: 2025-02-27

## 2025-02-27 DIAGNOSIS — N40.1 BPH WITH LOWER URINARY TRACT SYMPTOMS WITHOUT URINARY OBSTRUCTION: ICD-10-CM

## 2025-02-27 DIAGNOSIS — Z90.79 S/P TURP (TRANSURETHRAL RESECTION OF PROSTATE): Primary | ICD-10-CM

## 2025-02-28 NOTE — PROGRESS NOTES
Name: Serge Tinoco      : 1950      MRN: [id number]  Encounter Provider: JORGE L Lopes  Encounter Date: 3/3/2025   Encounter department: [facility] [place]  :  Assessment & Plan  BPH with lower urinary tract symptoms without urinary obstruction  -S/P greenlight laser 10/2023, voiding well with no complaints.  -discontinued flomax and finasteride at that time.   -follow up yearly or prn for any symptoms.        Screening for prostate cancer  -PSA was previously 0.59 2023 and then increased to 1.31 2024 after discontinuing finasteride and flomax after surgery in 2023. Plan was to repeat PSA.  -Pt repeated PSA on Friday however this is not resulted yet.   -I informed the patient we would call him with results and next steps.   -Follow up yearly with PSA            History of Present Illness   Serge Tinoco is a 74 y.o. male who presents for follow up. He has a PMH LARRY with CPAP use, hypertension. Patient has a history of BPH s/p greenlight laser therapy 10/2023, also had urolift . He discontinued flomax and finasteride after surgery. Currently he is happy with his urinary patterns and denies any lower urinary tract symptoms.    Review of Systems   Constitutional:  Negative for chills and fever.   Gastrointestinal:  Negative for abdominal pain.   Genitourinary:  Negative for difficulty urinating, dysuria, flank pain, frequency, hematuria and urgency.          Objective   There were no vitals taken for this visit.    Physical Exam  Vitals reviewed.   Constitutional:       General: He is not in acute distress.     Appearance: Normal appearance. He is normal weight. He is not toxic-appearing.   HENT:      Head: Normocephalic and atraumatic.   Eyes:      Conjunctiva/sclera: Conjunctivae normal.   Cardiovascular:      Rate and Rhythm: Normal rate.   Pulmonary:      Effort: Pulmonary effort is normal.   Abdominal:      Palpations: Abdomen is soft.   Musculoskeletal:          General: Normal range of motion.      Cervical back: Normal range of motion.   Skin:     General: Skin is warm and dry.   Neurological:      Mental Status: He is alert and oriented to person, place, and time.   Psychiatric:         Mood and Affect: Mood normal.         Behavior: Behavior normal.          Results   Lab Results   Component Value Date    PSA 1.31 07/22/2024     Lab Results   Component Value Date    CALCIUM 9.8 01/10/2024    K 4.8 01/10/2024    CO2 24 01/10/2024     01/10/2024    BUN 16 01/10/2024    CREATININE 1.15 01/10/2024     Lab Results   Component Value Date    WBC 6.10 06/25/2020    HGB 15.2 06/25/2020    HCT 45.2 06/25/2020    MCV 93 06/25/2020     06/25/2020       Office Urine Dip  No results found for this or any previous visit (from the past hour).

## 2025-03-03 ENCOUNTER — OFFICE VISIT (OUTPATIENT)
Dept: UROLOGY | Facility: MEDICAL CENTER | Age: 75
End: 2025-03-03
Payer: MEDICARE

## 2025-03-03 VITALS
SYSTOLIC BLOOD PRESSURE: 110 MMHG | BODY MASS INDEX: 30.06 KG/M2 | HEIGHT: 70 IN | WEIGHT: 210 LBS | DIASTOLIC BLOOD PRESSURE: 60 MMHG

## 2025-03-03 DIAGNOSIS — Z12.5 SCREENING FOR PROSTATE CANCER: ICD-10-CM

## 2025-03-03 DIAGNOSIS — N40.1 BPH WITH LOWER URINARY TRACT SYMPTOMS WITHOUT URINARY OBSTRUCTION: Primary | ICD-10-CM

## 2025-03-03 PROCEDURE — 99213 OFFICE O/P EST LOW 20 MIN: CPT

## 2025-03-03 RX ORDER — TIOTROPIUM BROMIDE AND OLODATEROL 3.124; 2.736 UG/1; UG/1
SPRAY, METERED RESPIRATORY (INHALATION)
COMMUNITY
Start: 2025-01-23

## 2025-03-03 RX ORDER — IVERMECTIN 3 MG/1
TABLET ORAL
COMMUNITY
Start: 2024-12-02

## 2025-03-03 RX ORDER — TACROLIMUS 0.3 MG/G
OINTMENT TOPICAL
COMMUNITY

## 2025-03-03 RX ORDER — FLUTICASONE PROPIONATE 50 MCG
SPRAY, SUSPENSION (ML) NASAL
COMMUNITY
Start: 2024-12-31

## 2025-03-03 RX ORDER — DUPILUMAB 300 MG/2ML
INJECTION, SOLUTION SUBCUTANEOUS
COMMUNITY
Start: 2024-11-27

## 2025-03-04 ENCOUNTER — RESULTS FOLLOW-UP (OUTPATIENT)
Dept: UROLOGY | Facility: AMBULATORY SURGERY CENTER | Age: 75
End: 2025-03-04

## 2025-03-04 ENCOUNTER — PATIENT MESSAGE (OUTPATIENT)
Dept: UROLOGY | Facility: MEDICAL CENTER | Age: 75
End: 2025-03-04

## 2025-03-04 DIAGNOSIS — Z12.5 SCREENING FOR PROSTATE CANCER: Primary | ICD-10-CM

## 2025-03-04 NOTE — PATIENT COMMUNICATION
Please assist patient with getting results.    I do not see results in chart.     Please send to provider, once we receive

## 2025-08-11 ENCOUNTER — TELEPHONE (OUTPATIENT)
Age: 75
End: 2025-08-11

## (undated) DEVICE — TRANSPOSAL ULTRAFLEX DUO/QUAD ULTRA CART MANIFOLD

## (undated) DEVICE — GLOVE SRG BIOGEL 7.5

## (undated) DEVICE — INVIEW CLEAR LEGGINGS: Brand: CONVERTORS

## (undated) DEVICE — SINGLE PORT MANIFOLD: Brand: NEPTUNE 2

## (undated) DEVICE — STERILE SURGICAL LUBRICANT,  TUBE: Brand: SURGILUBE

## (undated) DEVICE — PACK TUR

## (undated) DEVICE — BAG URINE DRAINAGE 2000ML ANTI RFLX LF

## (undated) DEVICE — SCD SEQUENTIAL COMPRESSION COMFORT SLEEVE MEDIUM KNEE LENGTH: Brand: KENDALL SCD

## (undated) DEVICE — UROCATCH BAG

## (undated) DEVICE — UROLOGIC DRAIN BAG: Brand: UNBRANDED

## (undated) DEVICE — LASER FIBER: Brand: MOXY

## (undated) DEVICE — 3M™ STERI-STRIP™ COMPOUND BENZOIN TINCTURE 40 BAGS/CARTON 4 CARTONS/CASE C1544: Brand: 3M™ STERI-STRIP™

## (undated) DEVICE — PREMIUM DRY TRAY LF: Brand: MEDLINE INDUSTRIES, INC.

## (undated) DEVICE — TUBING SUCTION 5MM X 12 FT

## (undated) DEVICE — IV SET 15 DROP 3/Y GRAVITY CARESITE

## (undated) DEVICE — 3M™ DURAPORE™ SURGICAL TAPE 1538-3, 3 INCH X 10 YARD (7,5CM X 9,1M), 4 ROLLS/BOX: Brand: 3M™ DURAPORE™

## (undated) DEVICE — CATH FOLEY 24FR 5ML 2 WAY SILICONE ELASTIMER

## (undated) DEVICE — LUBRICANT SURGILUBE TUBE 4 OZ  FLIP TOP